# Patient Record
Sex: FEMALE | Race: WHITE | NOT HISPANIC OR LATINO | Employment: FULL TIME | URBAN - METROPOLITAN AREA
[De-identification: names, ages, dates, MRNs, and addresses within clinical notes are randomized per-mention and may not be internally consistent; named-entity substitution may affect disease eponyms.]

---

## 2018-01-03 ENCOUNTER — HOSPITAL ENCOUNTER (EMERGENCY)
Facility: HOSPITAL | Age: 63
End: 2018-01-04
Attending: EMERGENCY MEDICINE | Admitting: EMERGENCY MEDICINE
Payer: COMMERCIAL

## 2018-01-03 ENCOUNTER — APPOINTMENT (EMERGENCY)
Dept: CT IMAGING | Facility: HOSPITAL | Age: 63
End: 2018-01-03
Payer: COMMERCIAL

## 2018-01-03 DIAGNOSIS — L03.90 CELLULITIS: ICD-10-CM

## 2018-01-03 DIAGNOSIS — S42.302A LEFT HUMERAL FRACTURE: ICD-10-CM

## 2018-01-03 DIAGNOSIS — L02.31 GLUTEAL ABSCESS: Primary | ICD-10-CM

## 2018-01-03 LAB
ALBUMIN SERPL BCP-MCNC: 3.2 G/DL (ref 3.5–5)
ALP SERPL-CCNC: 77 U/L (ref 46–116)
ALT SERPL W P-5'-P-CCNC: 22 U/L (ref 12–78)
ANION GAP SERPL CALCULATED.3IONS-SCNC: 12 MMOL/L (ref 4–13)
AST SERPL W P-5'-P-CCNC: 12 U/L (ref 5–45)
BASOPHILS # BLD AUTO: 0.07 THOUSANDS/ΜL (ref 0–0.1)
BASOPHILS NFR BLD AUTO: 0 % (ref 0–1)
BILIRUB DIRECT SERPL-MCNC: 0.26 MG/DL (ref 0–0.2)
BILIRUB SERPL-MCNC: 1 MG/DL (ref 0.2–1)
BUN SERPL-MCNC: 12 MG/DL (ref 5–25)
CALCIUM SERPL-MCNC: 8.9 MG/DL (ref 8.3–10.1)
CHLORIDE SERPL-SCNC: 98 MMOL/L (ref 100–108)
CO2 SERPL-SCNC: 24 MMOL/L (ref 21–32)
CREAT SERPL-MCNC: 0.69 MG/DL (ref 0.6–1.3)
EOSINOPHIL # BLD AUTO: 0.01 THOUSAND/ΜL (ref 0–0.61)
EOSINOPHIL NFR BLD AUTO: 0 % (ref 0–6)
ERYTHROCYTE [DISTWIDTH] IN BLOOD BY AUTOMATED COUNT: 13.2 % (ref 11.6–15.1)
GFR SERPL CREATININE-BSD FRML MDRD: 94 ML/MIN/1.73SQ M
GLUCOSE SERPL-MCNC: 184 MG/DL (ref 65–140)
HCT VFR BLD AUTO: 42 % (ref 34.8–46.1)
HGB BLD-MCNC: 14.2 G/DL (ref 11.5–15.4)
LACTATE SERPL-SCNC: 1.4 MMOL/L (ref 0.5–2)
LIPASE SERPL-CCNC: 36 U/L (ref 73–393)
LYMPHOCYTES # BLD AUTO: 1.61 THOUSANDS/ΜL (ref 0.6–4.47)
LYMPHOCYTES NFR BLD AUTO: 6 % (ref 14–44)
MCH RBC QN AUTO: 29.8 PG (ref 26.8–34.3)
MCHC RBC AUTO-ENTMCNC: 33.8 G/DL (ref 31.4–37.4)
MCV RBC AUTO: 88 FL (ref 82–98)
MONOCYTES # BLD AUTO: 2.07 THOUSAND/ΜL (ref 0.17–1.22)
MONOCYTES NFR BLD AUTO: 8 % (ref 4–12)
NEUTROPHILS # BLD AUTO: 23.01 THOUSANDS/ΜL (ref 1.85–7.62)
NEUTS SEG NFR BLD AUTO: 85 % (ref 43–75)
NRBC BLD AUTO-RTO: 0 /100 WBCS
PLATELET # BLD AUTO: 248 THOUSANDS/UL (ref 149–390)
PMV BLD AUTO: 9.3 FL (ref 8.9–12.7)
POTASSIUM SERPL-SCNC: 3.7 MMOL/L (ref 3.5–5.3)
PROT SERPL-MCNC: 7.5 G/DL (ref 6.4–8.2)
RBC # BLD AUTO: 4.77 MILLION/UL (ref 3.81–5.12)
SODIUM SERPL-SCNC: 134 MMOL/L (ref 136–145)
WBC # BLD AUTO: 27 THOUSAND/UL (ref 4.31–10.16)

## 2018-01-03 PROCEDURE — 83605 ASSAY OF LACTIC ACID: CPT | Performed by: EMERGENCY MEDICINE

## 2018-01-03 PROCEDURE — 80048 BASIC METABOLIC PNL TOTAL CA: CPT | Performed by: EMERGENCY MEDICINE

## 2018-01-03 PROCEDURE — 36415 COLL VENOUS BLD VENIPUNCTURE: CPT | Performed by: EMERGENCY MEDICINE

## 2018-01-03 PROCEDURE — 96375 TX/PRO/DX INJ NEW DRUG ADDON: CPT

## 2018-01-03 PROCEDURE — 93005 ELECTROCARDIOGRAM TRACING: CPT

## 2018-01-03 PROCEDURE — 85025 COMPLETE CBC W/AUTO DIFF WBC: CPT | Performed by: EMERGENCY MEDICINE

## 2018-01-03 PROCEDURE — 80076 HEPATIC FUNCTION PANEL: CPT | Performed by: EMERGENCY MEDICINE

## 2018-01-03 PROCEDURE — 96365 THER/PROPH/DIAG IV INF INIT: CPT

## 2018-01-03 PROCEDURE — 74177 CT ABD & PELVIS W/CONTRAST: CPT

## 2018-01-03 PROCEDURE — 83690 ASSAY OF LIPASE: CPT | Performed by: EMERGENCY MEDICINE

## 2018-01-03 PROCEDURE — 87040 BLOOD CULTURE FOR BACTERIA: CPT | Performed by: EMERGENCY MEDICINE

## 2018-01-03 RX ORDER — FENTANYL CITRATE 50 UG/ML
100 INJECTION, SOLUTION INTRAMUSCULAR; INTRAVENOUS ONCE
Status: COMPLETED | OUTPATIENT
Start: 2018-01-03 | End: 2018-01-03

## 2018-01-03 RX ORDER — LIDOCAINE HYDROCHLORIDE 20 MG/ML
10 INJECTION, SOLUTION EPIDURAL; INFILTRATION; INTRACAUDAL; PERINEURAL ONCE
Status: COMPLETED | OUTPATIENT
Start: 2018-01-03 | End: 2018-01-03

## 2018-01-03 RX ORDER — ONDANSETRON 2 MG/ML
4 INJECTION INTRAMUSCULAR; INTRAVENOUS ONCE
Status: COMPLETED | OUTPATIENT
Start: 2018-01-03 | End: 2018-01-03

## 2018-01-03 RX ORDER — CLINDAMYCIN PHOSPHATE 600 MG/50ML
600 INJECTION INTRAVENOUS EVERY 8 HOURS
Status: DISCONTINUED | OUTPATIENT
Start: 2018-01-03 | End: 2018-01-04 | Stop reason: HOSPADM

## 2018-01-03 RX ADMIN — FENTANYL CITRATE 100 MCG: 50 INJECTION INTRAMUSCULAR; INTRAVENOUS at 23:01

## 2018-01-03 RX ADMIN — ONDANSETRON 4 MG: 2 INJECTION INTRAMUSCULAR; INTRAVENOUS at 23:01

## 2018-01-03 RX ADMIN — IOHEXOL 100 ML: 350 INJECTION, SOLUTION INTRAVENOUS at 23:31

## 2018-01-03 RX ADMIN — CLINDAMYCIN PHOSPHATE 600 MG: 12 INJECTION, SOLUTION INTRAMUSCULAR; INTRAVENOUS at 23:01

## 2018-01-03 RX ADMIN — LIDOCAINE HYDROCHLORIDE 10 ML: 20 INJECTION, SOLUTION EPIDURAL; INFILTRATION; INTRACAUDAL; PERINEURAL at 23:00

## 2018-01-03 RX ADMIN — SODIUM CHLORIDE 1000 ML: 0.9 INJECTION, SOLUTION INTRAVENOUS at 23:01

## 2018-01-04 ENCOUNTER — HOSPITAL ENCOUNTER (INPATIENT)
Facility: HOSPITAL | Age: 63
LOS: 5 days | Discharge: HOME HEALTH | DRG: 746 | End: 2018-01-09
Attending: SURGERY | Admitting: SURGERY
Payer: COMMERCIAL

## 2018-01-04 ENCOUNTER — ANESTHESIA (INPATIENT)
Dept: PERIOP | Facility: HOSPITAL | Age: 63
DRG: 746 | End: 2018-01-04
Payer: COMMERCIAL

## 2018-01-04 ENCOUNTER — ANESTHESIA EVENT (INPATIENT)
Dept: PERIOP | Facility: HOSPITAL | Age: 63
DRG: 746 | End: 2018-01-04
Payer: COMMERCIAL

## 2018-01-04 VITALS
OXYGEN SATURATION: 95 % | SYSTOLIC BLOOD PRESSURE: 134 MMHG | WEIGHT: 290 LBS | RESPIRATION RATE: 18 BRPM | DIASTOLIC BLOOD PRESSURE: 65 MMHG | HEART RATE: 106 BPM | TEMPERATURE: 99.2 F | HEIGHT: 68 IN | BODY MASS INDEX: 43.95 KG/M2

## 2018-01-04 DIAGNOSIS — L08.9 SOFT TISSUE INFECTION: Primary | ICD-10-CM

## 2018-01-04 LAB
ABO GROUP BLD: NORMAL
ANION GAP SERPL CALCULATED.3IONS-SCNC: 9 MMOL/L (ref 4–13)
ANION GAP SERPL CALCULATED.3IONS-SCNC: 9 MMOL/L (ref 4–13)
ATRIAL RATE: 111 BPM
BLD GP AB SCN SERPL QL: NEGATIVE
BUN SERPL-MCNC: 11 MG/DL (ref 5–25)
BUN SERPL-MCNC: 11 MG/DL (ref 5–25)
CALCIUM SERPL-MCNC: 8.1 MG/DL (ref 8.3–10.1)
CALCIUM SERPL-MCNC: 8.6 MG/DL (ref 8.3–10.1)
CHLORIDE SERPL-SCNC: 101 MMOL/L (ref 100–108)
CHLORIDE SERPL-SCNC: 104 MMOL/L (ref 100–108)
CO2 SERPL-SCNC: 21 MMOL/L (ref 21–32)
CO2 SERPL-SCNC: 24 MMOL/L (ref 21–32)
CREAT SERPL-MCNC: 0.64 MG/DL (ref 0.6–1.3)
CREAT SERPL-MCNC: 0.69 MG/DL (ref 0.6–1.3)
ERYTHROCYTE [DISTWIDTH] IN BLOOD BY AUTOMATED COUNT: 13.4 % (ref 11.6–15.1)
GFR SERPL CREATININE-BSD FRML MDRD: 94 ML/MIN/1.73SQ M
GFR SERPL CREATININE-BSD FRML MDRD: 97 ML/MIN/1.73SQ M
GLUCOSE SERPL-MCNC: 147 MG/DL (ref 65–140)
GLUCOSE SERPL-MCNC: 157 MG/DL (ref 65–140)
GLUCOSE SERPL-MCNC: 160 MG/DL (ref 65–140)
HCT VFR BLD AUTO: 39 % (ref 34.8–46.1)
HGB BLD-MCNC: 13.2 G/DL (ref 11.5–15.4)
LACTATE SERPL-SCNC: 1.1 MMOL/L (ref 0.5–2)
LACTATE SERPL-SCNC: 1.3 MMOL/L (ref 0.5–2)
MCH RBC QN AUTO: 30.6 PG (ref 26.8–34.3)
MCHC RBC AUTO-ENTMCNC: 33.8 G/DL (ref 31.4–37.4)
MCV RBC AUTO: 90 FL (ref 82–98)
P AXIS: 65 DEGREES
PLATELET # BLD AUTO: 207 THOUSANDS/UL (ref 149–390)
PLATELET # BLD AUTO: 228 THOUSANDS/UL (ref 149–390)
PMV BLD AUTO: 9.1 FL (ref 8.9–12.7)
PMV BLD AUTO: 9.7 FL (ref 8.9–12.7)
POTASSIUM SERPL-SCNC: 3.5 MMOL/L (ref 3.5–5.3)
POTASSIUM SERPL-SCNC: 3.7 MMOL/L (ref 3.5–5.3)
PR INTERVAL: 142 MS
QRS AXIS: -9 DEGREES
QRSD INTERVAL: 92 MS
QT INTERVAL: 368 MS
QTC INTERVAL: 500 MS
RBC # BLD AUTO: 4.32 MILLION/UL (ref 3.81–5.12)
RH BLD: POSITIVE
SODIUM SERPL-SCNC: 134 MMOL/L (ref 136–145)
SODIUM SERPL-SCNC: 134 MMOL/L (ref 136–145)
SPECIMEN EXPIRATION DATE: NORMAL
T WAVE AXIS: 71 DEGREES
VENTRICULAR RATE: 111 BPM
WBC # BLD AUTO: 14.97 THOUSAND/UL (ref 4.31–10.16)

## 2018-01-04 PROCEDURE — 80048 BASIC METABOLIC PNL TOTAL CA: CPT | Performed by: SURGERY

## 2018-01-04 PROCEDURE — 87102 FUNGUS ISOLATION CULTURE: CPT | Performed by: SURGERY

## 2018-01-04 PROCEDURE — 87075 CULTR BACTERIA EXCEPT BLOOD: CPT | Performed by: SURGERY

## 2018-01-04 PROCEDURE — 87176 TISSUE HOMOGENIZATION CULTR: CPT | Performed by: SURGERY

## 2018-01-04 PROCEDURE — 99285 EMERGENCY DEPT VISIT HI MDM: CPT

## 2018-01-04 PROCEDURE — 36415 COLL VENOUS BLD VENIPUNCTURE: CPT | Performed by: SURGERY

## 2018-01-04 PROCEDURE — 87070 CULTURE OTHR SPECIMN AEROBIC: CPT | Performed by: SURGERY

## 2018-01-04 PROCEDURE — 82948 REAGENT STRIP/BLOOD GLUCOSE: CPT

## 2018-01-04 PROCEDURE — 85027 COMPLETE CBC AUTOMATED: CPT | Performed by: SURGERY

## 2018-01-04 PROCEDURE — 99284 EMERGENCY DEPT VISIT MOD MDM: CPT

## 2018-01-04 PROCEDURE — 86850 RBC ANTIBODY SCREEN: CPT | Performed by: SURGERY

## 2018-01-04 PROCEDURE — 83605 ASSAY OF LACTIC ACID: CPT | Performed by: SURGERY

## 2018-01-04 PROCEDURE — 87116 MYCOBACTERIA CULTURE: CPT | Performed by: SURGERY

## 2018-01-04 PROCEDURE — 0UBM0ZX EXCISION OF VULVA, OPEN APPROACH, DIAGNOSTIC: ICD-10-PCS | Performed by: SURGERY

## 2018-01-04 PROCEDURE — 87205 SMEAR GRAM STAIN: CPT | Performed by: SURGERY

## 2018-01-04 PROCEDURE — 85049 AUTOMATED PLATELET COUNT: CPT | Performed by: SURGERY

## 2018-01-04 PROCEDURE — 86901 BLOOD TYPING SEROLOGIC RH(D): CPT | Performed by: SURGERY

## 2018-01-04 PROCEDURE — 87206 SMEAR FLUORESCENT/ACID STAI: CPT | Performed by: SURGERY

## 2018-01-04 PROCEDURE — 87185 SC STD ENZYME DETCJ PER NZM: CPT | Performed by: SURGERY

## 2018-01-04 PROCEDURE — 96367 TX/PROPH/DG ADDL SEQ IV INF: CPT

## 2018-01-04 PROCEDURE — 86900 BLOOD TYPING SEROLOGIC ABO: CPT | Performed by: SURGERY

## 2018-01-04 RX ORDER — ACETAMINOPHEN 325 MG/1
650 TABLET ORAL EVERY 6 HOURS PRN
Status: DISCONTINUED | OUTPATIENT
Start: 2018-01-04 | End: 2018-01-05

## 2018-01-04 RX ORDER — FENTANYL CITRATE 50 UG/ML
25 INJECTION, SOLUTION INTRAMUSCULAR; INTRAVENOUS
Status: DISCONTINUED | OUTPATIENT
Start: 2018-01-04 | End: 2018-01-05

## 2018-01-04 RX ORDER — HEPARIN SODIUM 5000 [USP'U]/ML
5000 INJECTION, SOLUTION INTRAVENOUS; SUBCUTANEOUS EVERY 8 HOURS SCHEDULED
Status: DISCONTINUED | OUTPATIENT
Start: 2018-01-04 | End: 2018-01-09 | Stop reason: HOSPADM

## 2018-01-04 RX ORDER — FENTANYL CITRATE/PF 50 MCG/ML
25 SYRINGE (ML) INJECTION
Status: DISCONTINUED | OUTPATIENT
Start: 2018-01-04 | End: 2018-01-04 | Stop reason: HOSPADM

## 2018-01-04 RX ORDER — CLINDAMYCIN PHOSPHATE 600 MG/50ML
600 INJECTION INTRAVENOUS EVERY 8 HOURS
Status: DISCONTINUED | OUTPATIENT
Start: 2018-01-04 | End: 2018-01-07

## 2018-01-04 RX ORDER — ONDANSETRON 2 MG/ML
4 INJECTION INTRAMUSCULAR; INTRAVENOUS ONCE AS NEEDED
Status: DISCONTINUED | OUTPATIENT
Start: 2018-01-04 | End: 2018-01-04 | Stop reason: HOSPADM

## 2018-01-04 RX ORDER — FENTANYL CITRATE 50 UG/ML
INJECTION, SOLUTION INTRAMUSCULAR; INTRAVENOUS AS NEEDED
Status: DISCONTINUED | OUTPATIENT
Start: 2018-01-04 | End: 2018-01-04 | Stop reason: SURG

## 2018-01-04 RX ORDER — PROPOFOL 10 MG/ML
INJECTION, EMULSION INTRAVENOUS AS NEEDED
Status: DISCONTINUED | OUTPATIENT
Start: 2018-01-04 | End: 2018-01-04 | Stop reason: SURG

## 2018-01-04 RX ORDER — MAGNESIUM HYDROXIDE 1200 MG/15ML
LIQUID ORAL AS NEEDED
Status: DISCONTINUED | OUTPATIENT
Start: 2018-01-04 | End: 2018-01-04 | Stop reason: HOSPADM

## 2018-01-04 RX ORDER — ALBUTEROL SULFATE 2.5 MG/3ML
2.5 SOLUTION RESPIRATORY (INHALATION) EVERY 4 HOURS PRN
Status: DISCONTINUED | OUTPATIENT
Start: 2018-01-04 | End: 2018-01-04 | Stop reason: HOSPADM

## 2018-01-04 RX ORDER — SUCCINYLCHOLINE CHLORIDE 20 MG/ML
INJECTION INTRAMUSCULAR; INTRAVENOUS AS NEEDED
Status: DISCONTINUED | OUTPATIENT
Start: 2018-01-04 | End: 2018-01-04 | Stop reason: SURG

## 2018-01-04 RX ORDER — KETOROLAC TROMETHAMINE 30 MG/ML
INJECTION, SOLUTION INTRAMUSCULAR; INTRAVENOUS AS NEEDED
Status: DISCONTINUED | OUTPATIENT
Start: 2018-01-04 | End: 2018-01-04 | Stop reason: SURG

## 2018-01-04 RX ORDER — PROMETHAZINE HYDROCHLORIDE 25 MG/ML
12.5 INJECTION, SOLUTION INTRAMUSCULAR; INTRAVENOUS ONCE AS NEEDED
Status: DISCONTINUED | OUTPATIENT
Start: 2018-01-04 | End: 2018-01-04 | Stop reason: HOSPADM

## 2018-01-04 RX ORDER — SODIUM CHLORIDE 9 MG/ML
125 INJECTION, SOLUTION INTRAVENOUS CONTINUOUS
Status: DISCONTINUED | OUTPATIENT
Start: 2018-01-04 | End: 2018-01-04

## 2018-01-04 RX ORDER — DIPHENHYDRAMINE HYDROCHLORIDE 50 MG/ML
INJECTION INTRAMUSCULAR; INTRAVENOUS
Status: DISPENSED
Start: 2018-01-04 | End: 2018-01-04

## 2018-01-04 RX ORDER — LEVOTHYROXINE SODIUM 0.12 MG/1
125 TABLET ORAL DAILY
COMMUNITY

## 2018-01-04 RX ADMIN — FENTANYL CITRATE 25 MCG: 50 INJECTION INTRAMUSCULAR; INTRAVENOUS at 03:29

## 2018-01-04 RX ADMIN — CLINDAMYCIN PHOSPHATE 600 MG: 12 INJECTION, SOLUTION INTRAMUSCULAR; INTRAVENOUS at 14:40

## 2018-01-04 RX ADMIN — HEPARIN SODIUM 5000 UNITS: 5000 INJECTION, SOLUTION INTRAVENOUS; SUBCUTANEOUS at 22:16

## 2018-01-04 RX ADMIN — SUCCINYLCHOLINE CHLORIDE 160 MG: 20 INJECTION, SOLUTION INTRAMUSCULAR; INTRAVENOUS at 05:13

## 2018-01-04 RX ADMIN — FENTANYL CITRATE 50 MCG: 50 INJECTION, SOLUTION INTRAMUSCULAR; INTRAVENOUS at 05:29

## 2018-01-04 RX ADMIN — Medication 2000 MG: at 13:47

## 2018-01-04 RX ADMIN — DEXTROSE MONOHYDRATE 3.38 G: 50 INJECTION, SOLUTION INTRAVENOUS at 04:37

## 2018-01-04 RX ADMIN — FENTANYL CITRATE 50 MCG: 50 INJECTION, SOLUTION INTRAMUSCULAR; INTRAVENOUS at 05:36

## 2018-01-04 RX ADMIN — PROPOFOL 200 MG: 10 INJECTION, EMULSION INTRAVENOUS at 05:13

## 2018-01-04 RX ADMIN — LIDOCAINE HYDROCHLORIDE 100 MG: 20 INJECTION, SOLUTION INTRAVENOUS at 05:13

## 2018-01-04 RX ADMIN — HEPARIN SODIUM 5000 UNITS: 5000 INJECTION, SOLUTION INTRAVENOUS; SUBCUTANEOUS at 13:52

## 2018-01-04 RX ADMIN — SODIUM CHLORIDE 2000 ML: 0.9 INJECTION, SOLUTION INTRAVENOUS at 03:31

## 2018-01-04 RX ADMIN — Medication 2000 MG: at 01:16

## 2018-01-04 RX ADMIN — ACETAMINOPHEN 650 MG: 325 TABLET, FILM COATED ORAL at 22:16

## 2018-01-04 RX ADMIN — FENTANYL CITRATE 25 MCG: 50 INJECTION INTRAMUSCULAR; INTRAVENOUS at 19:44

## 2018-01-04 RX ADMIN — CLINDAMYCIN PHOSPHATE 600 MG: 12 INJECTION, SOLUTION INTRAMUSCULAR; INTRAVENOUS at 22:22

## 2018-01-04 RX ADMIN — SODIUM CHLORIDE 125 ML/HR: 0.9 INJECTION, SOLUTION INTRAVENOUS at 12:23

## 2018-01-04 RX ADMIN — FENTANYL CITRATE 25 MCG: 50 INJECTION INTRAMUSCULAR; INTRAVENOUS at 14:00

## 2018-01-04 RX ADMIN — VANCOMYCIN HYDROCHLORIDE 2000 MG: 10 INJECTION, POWDER, LYOPHILIZED, FOR SOLUTION INTRAVENOUS at 19:37

## 2018-01-04 RX ADMIN — KETOROLAC TROMETHAMINE 30 MG: 30 INJECTION, SOLUTION INTRAMUSCULAR at 05:51

## 2018-01-04 RX ADMIN — SODIUM CHLORIDE: 0.9 INJECTION, SOLUTION INTRAVENOUS at 05:02

## 2018-01-04 RX ADMIN — FENTANYL CITRATE 25 MCG: 50 INJECTION INTRAMUSCULAR; INTRAVENOUS at 08:20

## 2018-01-04 RX ADMIN — DIPHENHYDRAMINE HYDROCHLORIDE 50 MG: 50 INJECTION, SOLUTION INTRAMUSCULAR; INTRAVENOUS at 04:37

## 2018-01-04 RX ADMIN — VANCOMYCIN HYDROCHLORIDE 2000 MG: 10 INJECTION, POWDER, LYOPHILIZED, FOR SOLUTION INTRAVENOUS at 05:20

## 2018-01-04 NOTE — SOCIAL WORK
Cm explained role of cm to pt at bedside  Patient lives alone in 1 story home, 3 WILL  Prior to admission, patient was independent with ADLs  Patient has cane, commode, and shower chair in home  Patient reports having home health through [facility] after her hip replacement but does not recall agency  Patient also has used [facility] for outpatient pt/ot around that time as well  No hx of mental health or alcohol/drug treatment  Preferred pharmacy is [facility] in [place], PCP is Dr Noe Yung in [place]  CM reviewed d/c planning process including the following: identifying help at home, patient preference for d/c planning needs, Discharge Lounge, [facility]s to [facility] program, availability of treatment team to discuss questions or concerns patient and/or family may have regarding understanding medications and recognizing signs and symptoms once discharged  CM also encouraged patient to follow up with all recommended appointments after discharge   Patient advised of importance for patient and family to participate in managing patients medical well being     ~I have reviewed and agree to the above documentation~  Keven Oliveira MSW

## 2018-01-04 NOTE — PROGRESS NOTES
I discussed the proposed procedure with the patient including, risks, benefits, and alternatives  He/she understands and is agreeable to proceed

## 2018-01-04 NOTE — H&P
H&P Exam - General Surgery   Yakelin Triana 65 y o  female MRN: [id number]  Unit/Bed#: ED 14 Encounter: [id number]    Assessment/Plan     Assessment:  66 yo F with soft tissue infection of right labia extending to the perineum and inferior gluteal soft tissues  CT shows evidence of gas in tissue  Significant leukocytosis is present (WBC 27)  Plan:  OR as add on for incision & drainage vs excisional debridement of right perineum  Preop, T&S, consent for OR  2L bolus now, NPO  Fentanyl for pain- reports allergy to morphine  Zosyn now preoperatively (recieved Cefepime & Clinda at [facility])  Will order Vanc/Zosyn/Clinda post op  F/U Blood and wound cx    History of Present Illness     HPI:  Yakelin Triana is a 65 y o  female who presents with  5 day history of worsening pain in her right groin  She states she fell on Friday (and fractured her left humerus) and noticed pain the next day, and believed it was due to her fall  However, the pain in her right labial area increased and she began to experience fevers, chills, nausea, and vomiting, especially today  She denies hx of DM (states she is pre-diabetic) or hx of soft tissue infections  She went to the [facility] where an I&D of the right lateral labia was performed  Purulent drainage was reported  CT was performed which shows extensive subcutaneous inflammation in the subcutaneous soft tissues of the right aspect of the vagina and mons pubis    There is also a nodular area of soft tissue inflammation in the right labia majora, suggesting a 2 4 cm phlegmon or organizing abscess      Review of Systems as per HPI    Historical Information   Past Medical History:   Diagnosis Date    Cancer [facility])     thyroid    Hiatal hernia     Prediabetes      Past Surgical History:   Procedure Laterality Date    CHOLECYSTECTOMY      MECKEL DIVERTICULUM EXCISION       Social History   History   Alcohol Use No     History   Drug Use No     History   Smoking Status    Never Smoker   Smokeless Tobacco    Never Used     Family History: non-contributory    Meds/Allergies   all medications and allergies reviewed  Allergies   Allergen Reactions    Morphine     Penicillins        Objective   First Vitals:   Blood Pressure: 124/65 (01/04/18 0242)  Pulse: (!) 108 (01/04/18 0242)  Temperature: 98 7 °F (37 1 °C) (01/04/18 0244)  Temp Source: Oral (01/04/18 0244)  Respirations: 20 (01/04/18 0242)  Height: 5' 8" (172 7 cm) (01/04/18 0242)  Weight - Scale: 132 kg (290 lb) (01/04/18 0242)  SpO2: 94 % (01/04/18 0242)    Current Vitals:   Blood Pressure: 124/65 (01/04/18 0242)  Pulse: (!) 108 (01/04/18 0242)  Temperature: 98 7 °F (37 1 °C) (01/04/18 0244)  Temp Source: Oral (01/04/18 0244)  Respirations: 20 (01/04/18 0242)  Height: 5' 8" (172 7 cm) (01/04/18 0242)  Weight - Scale: 132 kg (290 lb) (01/04/18 0242)  SpO2: 94 % (01/04/18 0242)    No intake or output data in the 24 hours ending 01/04/18 0301    Invasive Devices     Peripheral Intravenous Line            Peripheral IV 01/03/18 Right Hand less than 1 day                Physical Exam   Physical Exam:   Gen: A&O, NAD  Cardio: Slightly tachycardia  Lungs: CTAB  Abd: Soft, non distended, non tender  Groin: erythema and induration of right labia, extending to perineum  No crepitus  Small amount of sanginous drainage from I&D incision when pressure applied  Ext: Warm, dry   No erythema of thigh  Vasc: Intact      Lab Results:   CBC:   Lab Results   Component Value Date    WBC 27 00 (H) 01/03/2018    HGB 14 2 01/03/2018    HCT 42 0 01/03/2018    MCV 88 01/03/2018     01/03/2018    MCH 29 8 01/03/2018    MCHC 33 8 01/03/2018    RDW 13 2 01/03/2018    MPV 9 3 01/03/2018    NRBC 0 01/03/2018   , CMP:   Lab Results   Component Value Date     (L) 01/03/2018    K 3 7 01/03/2018    CL 98 (L) 01/03/2018    CO2 24 01/03/2018    ANIONGAP 12 01/03/2018    BUN 12 01/03/2018    CREATININE 0 69 01/03/2018    GLUCOSE 184 (H) 01/03/2018    CALCIUM 8 9 01/03/2018    AST 12 01/03/2018    ALT 22 01/03/2018    ALKPHOS 77 01/03/2018    PROT 7 5 01/03/2018    ALBUMIN 3 2 (L) 01/03/2018    BILITOT 1 00 01/03/2018    EGFR 94 01/03/2018     Imaging: I have personally reviewed pertinent reports  No orders to display   IMPRESSION:        1  Cellulitis involving the right aspect of the vagina and labia majora  2 4 cm lesion may represent phlegmon versus organizing abscess  2  Extensive inflammation and gas in the right aspect of the perineum and inferior gluteal soft tissues, concerning for necrotizing fasciitis versus postsurgical change from recent incision and drainage  No evidence of organized abscess in the perineum  3  No lymphadenopathy, free intraperitoneal air or collection  EKG, Pathology, and Other Studies: I have personally reviewed pertinent reports  Code Status: No Order  Advance Directive and Living Will:      Power of :    POLST:      Counseling / Coordination of Care  Total floor / unit time spent today 30 minutes  Greater than 50% of total time was spent with the patient and / or family counseling and / or coordination of care

## 2018-01-04 NOTE — CASE MANAGEMENT
ATTEMPTED TO SENT THRU [facility] AND [facility] FAILED    Initial Clinical Review    Admission: Date/Time/Statement: 1/4/18 @ 0300     Orders Placed This Encounter   Procedures    Inpatient Admission     Standing Status:   Standing     Number of Occurrences:   1     Order Specific Question:   Admitting Physician     Answer:   Jessica Rowe     Order Specific Question:   Level of Care     Answer:   Med Surg [16]     Order Specific Question:   Estimated length of stay     Answer:   More than 2 Midnights     Order Specific Question:   Certification     Answer:   I certify that inpatient services are medically necessary for this patient for a duration of greater than two midnights  See H&P and MD Progress Notes for additional information about the patient's course of treatment  ED: Date/Time/Mode of Arrival: 1/4 Transfer from [id number] [facility] ED to [address] [facility]  Pt at [facility] from 1/3 thru 1/4    ED Arrival Information     Expected Arrival 70 Sh[address] Dara Edwards of Arrival Escorted By Service Admission Type    1/4/2018 02:23 1/4/2018 02:38 Urgent Ambulance [facility] Urgent    Arrival Complaint    Groin pain          Chief Complaint:   Chief Complaint   Patient presents with    Abscess     pt transfer from [facility]        History of Illness: 65 y o  female who presents with  5 day history of worsening pain in her right groin  She states she fell on Friday (and fractured her left humerus) and noticed pain the next day, and believed it was due to her fall  However, the pain in her right labial area increased and she began to experience fevers, chills, nausea, and vomiting, especially today  She denies hx of DM (states she is pre-diabetic) or hx of soft tissue infections  She went to the [facility] where an I&D of the right lateral labia was performed  Purulent drainage was reported   CT was performed which shows extensive subcutaneous inflammation in the subcutaneous soft tissues of the right aspect of the vagina and mons pubis    There is also a nodular area of soft tissue inflammation in the right labia majora, suggesting a 2 4 cm phlegmon or organizing abscess        ED Vital Signs:   ED Triage Vitals   Temperature Pulse Respirations Blood Pressure SpO2   01/04/18 0244 01/04/18 0242 01/04/18 0242 01/04/18 0242 01/04/18 0242   98 7 °F (37 1 °C) (!) 108 20 124/65 94 %      Temp Source Heart Rate Source Patient Position - Orthostatic VS BP Location FiO2 (%)   01/04/18 0244 01/04/18 0242 01/04/18 0730 01/04/18 0730 --   Oral Monitor Lying Right arm       Pain Score       01/04/18 0242       8        Wt Readings from Last 1 Encounters:   01/04/18 132 kg (291 lb 0 1 oz)     O2 3L N/C 94%    Vital Signs (abnormal):   Date/Time  Temp  Pulse  Resp  BP  SpO2  O2 Device     01/04/18 0609  98 °F (36 7 °C)  103  16  112/63  95 %  Simple mask  --  --   01/04/18 0607  98 °F (36 7 °C)  101  15  112/63  95 %  Simple mask  WDL  --   01/04/18 0430  --  104  20  130/60  92 %           Abnormal Labs:   01/03/18 2256     WBC 4 31 - 10 16 [place]ousand/uL 27 00       01/04/18 0424     Sodium 136 - 145 mmol/L 134         Diagnostic Test Results: CT Abd/ Pelvis - 1  Cellulitis involving the right aspect of the vagina and labia majora  2 4 cm lesion may represent phlegmon versus organizing abscess  2  Extensive inflammation and gas in the right aspect of the perineum and inferior gluteal soft tissues, concerning for necrotizing fasciitis versus postsurgical change from recent incision and drainage  No evidence of organized abscess in the perineum  3  No lymphadenopathy, free intraperitoneal air or collection      ED Treatment:   Medication Administration from 01/04/2018 0223 to 01/04/2018 0447       Date/Time Order Dose Route Action     01/04/2018 0437 piperacillin-tazobactam (ZOSYN) 3 375 g in dextrose 5 % 50 mL IVPB 3 375 g Intravenous New Bag     01/04/2018 0331 sodium chloride 0 9 % bolus 2,000 mL 2,000 mL Intravenous New Bag     01/04/2018 0329 fentanyl citrate (PF) 100 MCG/2ML 25 mcg 25 mcg Intravenous Given     01/04/2018 0437 diphenhydrAMINE (BENADRYL) 50 mg in sodium chloride 0 9 % 50 mL IVPB 50 mg Intravenous New Bag          Past Medical/Surgical History: Active Ambulatory Problems     Diagnosis Date Noted    No Active Ambulatory Problems     Resolved Ambulatory Problems     Diagnosis Date Noted    No Resolved Ambulatory Problems     Past Medical History:   Diagnosis Date    Cancer ([facility] [address] )     Hiatal hernia     Prediabetes        Admitting Diagnosis: Groin pain [R10 30]  Soft tissue infection [L08 9]    Age/Sex: 65 y o  female       Assessment:  64 yo F with soft tissue infection of right labia extending to the perineum and inferior gluteal soft tissues  CT shows evidence of gas in tissue  Significant leukocytosis is present (WBC 27)       Plan:  OR as add on for incision & drainage vs excisional debridement of right perineum  Preop, T&S, consent for OR  2L bolus now, NPO  Fentanyl for pain- reports allergy to morphine  Zosyn now preoperatively (recieved Cefepime & Clinda at [place])  Will order Vanc/Zosyn/Clinda post op  F/U Blood and wound cx       Admission Orders:  NPO; Sips with meds  1/4 OR - incision and drainage DEBRIDEMENT right labia (Right Perineum)    Fungal culture  AFB Culture and Stain  Wound care    Scheduled Meds:   cefepime 2,000 mg Intravenous Q12H   clindamycin 600 mg Intravenous Q8H   diphenhydrAMINE      heparin (porcine) 5,000 Units Subcutaneous Q8H [facility]   vancomycin 15 mg/kg Intravenous Q12H     Continuous Infusions:   sodium chloride 125 mL/hr Last Rate: 125 mL/hr (01/04/18 0817)     PRN Meds:   acetaminophen    diphenhydrAMINE (BENADRYL) IVPB    fentanyl citrate (PF)    ondansetron

## 2018-01-04 NOTE — OP NOTE
OPERATIVE REPORT  PATIENT NAME: Miguel Angel Garcia    :  1955  MRN: [id number]  Pt Location:  OR ROOM 09    SURGERY DATE: 2018    Surgeon(s) and Role:     * Lias Momin MD - Assisting     * Brian Simeon MD - Primary    Preop Diagnosis:  Soft tissue infection [L08 9]    Post-Op Diagnosis Codes: * Soft tissue infection [L08 9]    Procedure(s) (LRB):  incision and drainage DEBRIDEMENT right labia (Right)    Specimen(s):  ID Type Source Tests Collected by Time Destination   1 :  Tissue Labia ANAEROBIC CULTURE AND GRAM STAIN, FUNGAL CULTURE, CULTURE, TISSUE AND GRAM STAIN, TISSUE EXAM, AFB CULTURE WITH STAIN Brian Simeon MD 2018 [id number]        Estimated Blood Loss:   Minimal    Drains:       Anesthesia Type:   General    Operative Indications:  Soft tissue infection [L08 9]  Right labial abscess    Operative Findings:  Right labial abscess cavity, sharply debrided, packed with 1" iodoform gauze    Complications:   None    Procedure and Technique: This is a 23-year-old female presented to [address] [place] [address] with a right labial swelling  A CT scan was performed which demonstrated air and subcutaneous tissues concerning for an abscess or necrotizing infection  A small incision and drainage was performed the emergency department at [facility] and the patient was sent downstairs pathologic for further care  Upon evaluation, the patient had a very erythematous and edematous right labial area concerning for necrotizing abscess  Therefore decision was made to take her to the operating room for a incision and drainage, washout debridement of the right labial abscess  All risks, benefits, alternatives were explained to the patient patient agreed the plan of care  Patient was identified by armband and verbal communication brought back to the operating room  She was induced by general anesthesia via endotracheal intubation  She was placed in lithotomy and a Fontana catheter was placed  Her perineal area was prepped with Betadine and draped in usual sterile fashion  A time-out was called to review the procedure and details  Antibiotics had already been administered in the emergency department  The previous incision and drainage incision from the emergency department was extended superiorly and inferiorly  Small amount of pus was expressed and this was sent for anaerobic and aerobic culture  The cavity was sharply debrided with a curette and Metzenbaum scissors  This was debrided down to healthy bleeding tissue  The wound was then evaluated and hemostasis was ensured  The wound was packed with 1 inch iodoform packing  Fluffs and mesh underwear were then placed, the patient was awoke from general anesthesia and brought to [facility] in stable condition  All instrument and sponge counts were correct at the conclusion of the case  RF scanning was negative  Dr Roland Hill was present for the entire operation        Patient Disposition:  PACU     SIGNATURE: Rosa Olvera MD  DATE: January 4, 2018  TIME: 6:10 AM

## 2018-01-04 NOTE — ED NOTES
Report given EMS prior to transport  THE [facility] ED and gave report to Mildred Joshua at [phone]       Marisela Palacio RN  01/04/18 1897

## 2018-01-04 NOTE — ED PROVIDER NOTES
History  Chief Complaint   Patient presents with    Vomiting     Pt c/o vomiting since this morning and "not being able to keep anything down"  Pt also c/o "lump on my private parts lip on the right"  Pt states she was recently started on pain medication and doesn't know if that "gave her the lump"     HPI patient is a 25-year-old female, she reports that she fell on Friday, slipped on a frozen sidewalk and landed breaking her left humerus, patient has been seen by Orthopedics and has non operative management  She reports that they gave her a shot of pain medicines while she was in the hospital she medially started vomiting after that  She reports taking Percocet for pain but vomiting every time she takes Percocet  She reports that through the day today she became hot and flushed  Reports some sweating at times  Reports lump in her vaginal area which now has become red and indurated  She reports that she cannot hold any food down  She denies any real abdominal pain other than the area around the lump  She reports some fever at home  She reports no other injury after the fall  She denies any neck pain or headache  Past medical history of cancer of the thyroid, pre diabetes, recent humerus fracture  Family history noncontributory  Social history nonsmoker no history of drug abuse  None       Past Medical History:   Diagnosis Date    Cancer ([facility] [address] )     thyroid    Hiatal hernia     Prediabetes        Past Surgical History:   Procedure Laterality Date    CHOLECYSTECTOMY      MECKEL DIVERTICULUM EXCISION         History reviewed  No pertinent family history  I have reviewed and agree with the history as documented  Social History   Substance Use Topics    Smoking status: Never Smoker    Smokeless tobacco: Never Used    Alcohol use No        Review of Systems   Constitutional: Positive for chills and fever  Negative for diaphoresis and fatigue     HENT: Negative for congestion, ear pain, nosebleeds and sore throat  Eyes: Negative for photophobia, pain, discharge and visual disturbance  Respiratory: Negative for cough, choking, chest tightness, shortness of breath and wheezing  Cardiovascular: Negative for chest pain and palpitations  Gastrointestinal: Positive for nausea and vomiting  Negative for abdominal distention, abdominal pain and diarrhea  Genitourinary: Negative for dysuria, flank pain and frequency  Musculoskeletal: Negative for back pain, gait problem and joint swelling  Skin: Negative for color change and rash  Neurological: Negative for dizziness, syncope and headaches  Psychiatric/Behavioral: Negative for behavioral problems and confusion  The patient is not nervous/anxious  All other systems reviewed and are negative  Reports a lump in her right groin    Physical Exam  ED Triage Vitals [01/03/18 2112]   Temperature Pulse Respirations Blood Pressure SpO2   99 2 °F (37 3 °C) (!) 115 18 165/74 94 %      Temp Source Heart Rate Source Patient Position - Orthostatic VS BP Location FiO2 (%)   Oral Monitor Sitting Right arm --      Pain Score       8           Orthostatic Vital Signs  Vitals:    01/03/18 2112   BP: 165/74   Pulse: (!) 115   Patient Position - Orthostatic VS: Sitting       Physical Exam   Constitutional: She is oriented to person, place, and time  She appears well-developed and well-nourished  HENT:   Head: Normocephalic  Right Ear: External ear normal    Left Ear: External ear normal    Nose: Nose normal    Mouth/Throat: Oropharynx is clear and moist    Eyes: EOM and lids are normal  Pupils are equal, round, and reactive to light  Neck: Normal range of motion  Neck supple  Cardiovascular: Normal rate, regular rhythm and normal heart sounds  Pulmonary/Chest: Effort normal and breath sounds normal  No respiratory distress  Abdominal: Soft  Bowel sounds are normal  She exhibits no mass  There is no tenderness   There is no rebound and no guarding  No hernia  Patient has a large lump lateral to her right labia consistent with abscess, there was some spontaneous drainage of follow smelling material, area was opened, see procedure note  Musculoskeletal: Normal range of motion  She exhibits no deformity  Neurological: She is alert and oriented to person, place, and time  Skin: Skin is warm and dry  Psychiatric: She has a normal mood and affect  Nursing note and vitals reviewed  ED Medications  Medications   clindamycin (CLEOCIN) IVPB (premix) 600 mg (0 mg Intravenous Stopped 1/3/18 2331)   cefepime (MAXIPIME) 2,000 mg in dextrose 5 % 50 mL IVPB (2,000 mg Intravenous New Bag 1/4/18 0116)   sodium chloride 0 9 % bolus 1,000 mL (0 mL Intravenous Stopped 1/4/18 0001)   lidocaine (PF) (XYLOCAINE-MPF) 2 % injection 10 mL (10 mL Infiltration Given by Other 1/3/18 2300)   ondansetron (ZOFRAN) injection 4 mg (4 mg Intravenous Given 1/3/18 2301)   fentanyl citrate (PF) 100 MCG/2ML 100 mcg (100 mcg Intravenous Given 1/3/18 2301)   iohexol (OMNIPAQUE) 350 MG/ML injection (MULTI-DOSE) 100 mL (100 mL Intravenous Given 1/3/18 2331)       Diagnostic Studies  Results Reviewed     Procedure Component Value Units Date/Time    Lactic acid, plasma [[id number]]  (Normal) Collected:  01/03/18 2256    Lab Status:  Final result Specimen:  Blood from Hand, Right Updated:  01/03/18 2321     LACTIC ACID 1 4 mmol/L     Narrative:         Result may be elevated if tourniquet was used during collection      Basic metabolic panel [[id number]]  (Abnormal) Collected:  01/03/18 2256    Lab Status:  Final result Specimen:  Blood from Hand, Right Updated:  01/03/18 2318     Sodium 134 (L) mmol/L      Potassium 3 7 mmol/L      Chloride 98 (L) mmol/L      CO2 24 mmol/L      Anion Gap 12 mmol/L      BUN 12 mg/dL      Creatinine 0 69 mg/dL      Glucose 184 (H) mg/dL      Calcium 8 9 mg/dL      eGFR 94 ml/min/1 73sq m     Narrative:         National Kidney Disease Education Program recommendations are as follows:  GFR calculation is accurate only with a steady state creatinine  Chronic Kidney disease less than 60 ml/min/1 73 sq  meters  Kidney failure less than 15 ml/min/1 73 sq  meters  Hepatic function panel [[id number]]  (Abnormal) Collected:  01/03/18 2256    Lab Status:  Final result Specimen:  Blood from Hand, Right Updated:  01/03/18 2318     Total Bilirubin 1 00 mg/dL      Bilirubin, Direct 0 26 (H) mg/dL      Alkaline Phosphatase 77 U/L      AST 12 U/L      ALT 22 U/L      Total Protein 7 5 g/dL      Albumin 3 2 (L) g/dL     Lipase [[id number]]  (Abnormal) Collected:  01/03/18 2256    Lab Status:  Final result Specimen:  Blood from Hand, Right Updated:  01/03/18 2318     Lipase 36 (L) u/L     Blood culture [[id number]] Collected:  01/03/18 2300    Lab Status: In process Specimen:  Blood from Arm, Right Updated:  01/03/18 2310    CBC and differential [[id number]]  (Abnormal) Collected:  01/03/18 2256    Lab Status:  Final result Specimen:  Blood from Hand, Right Updated:  01/03/18 2302     WBC 27 00 (H) Thousand/uL      RBC 4 77 Million/uL      Hemoglobin 14 2 g/dL      Hematocrit 42 0 %      MCV 88 fL      MCH 29 8 pg      MCHC 33 8 g/dL      RDW 13 2 %      MPV 9 3 fL      Platelets 111 Thousands/uL      nRBC 0 /100 WBCs      Neutrophils Relative 85 (H) %      Lymphocytes Relative 6 (L) %      Monocytes Relative 8 %      Eosinophils Relative 0 %      Basophils Relative 0 %      Neutrophils Absolute 23 01 (H) Thousands/µL      Lymphocytes Absolute 1 61 Thousands/µL      Monocytes Absolute 2 07 (H) Thousand/µL      Eosinophils Absolute 0 01 Thousand/µL      Basophils Absolute 0 07 Thousands/µL     Blood culture [[id number]] Collected:  01/03/18 2237    Lab Status: In process Specimen:  Blood from Hand, Left Updated:  01/03/18 2242                 CT abdomen pelvis w contrast   Final Result by Cuco Potter MD (01/04 0004)         1   Cellulitis involving the right aspect of the vagina and labia majora  2 4 cm lesion may represent phlegmon versus organizing abscess  2  Extensive inflammation and gas in the right aspect of the perineum and inferior gluteal soft tissues, concerning for necrotizing fasciitis versus postsurgical change from recent incision and drainage  No evidence of organized abscess in the perineum  3  No lymphadenopathy, free intraperitoneal air or collection  Workstation performed: [id number]                    Procedures  ECG 12 Lead Documentation  Date/Time: 1/3/2018 10:01 PM  Performed by: Erika Garcia  Authorized by: Erika Garcia     Indications / Diagnosis:  Tachycardia  Patient location:  ED  Previous ECG:     Previous ECG:  Unavailable  Interpretation:     Interpretation: non-specific    Rate:     ECG rate:  One hundred eleven    ECG rate assessment: tachycardic    Rhythm:     Rhythm: sinus tachycardia    Ectopy:     Ectopy: none    QRS:     QRS axis:  Normal  ST segments:     ST segments:  Non-specific  Comments:       no specific ST-T wave changes  Incision/Drainage  Date/Time: 1/3/2018 11:07 PM  Performed by: Mliss Schirmer by: Erika Garcia     Patient location:  ED  Other Assisting Provider: Yes (comment)    Consent:     Consent obtained:  Verbal    Consent given by:  Patient    Risks discussed:  Bleeding and incomplete drainage  Universal protocol:     Procedure explained and questions answered to patient or proxy's satisfaction: yes      Patient identity confirmed:  Verbally with patient  Location:     Type:  Abscess    Location:  Anogenital    Anogenital location:  Perineum  Pre-procedure details:     Skin preparation:  Betadine  Sedation:     Sedation type: Anxiolysis  Anesthesia (see MAR for exact dosages):      Anesthesia method:  Local infiltration    Local anesthetic:  Lidocaine 2% w/o epi  Procedure details:     Complexity:  Intermediate    Incision types:  Single straight    Scalpel blade:  11    Approach:  Open    Incision depth: Subcutaneous    Wound management:  Probed and deloculated    Drainage:  Purulent    Wound treatment:  Wound left open    Packing materials:  None  Post-procedure details:     Patient tolerance of procedure: Tolerated well, no immediate complications  Comments:      Incision and drainage of abscess with good drainage after anesthetized with lidocaine  Phone Contacts  ED Phone Contact    ED Course  ED Course         diagnostic testing showed a normal lactate at 1 4, no sign of severe sepsis at this time, patient's electrolytes showed a blood sugar 184, patient reports she is prediabetic, bicarb was normal no sign of severe acidosis, patient's electrolytes otherwise were normal normal renal function, patient had normal liver functions  White count was markedly elevated 27,000 consistent with infection  Lipase was normal no sign of pancreatitis  CT scan showed a cellulitis involving the right aspect of the vagina and labia majora 2 4 cm lesion with the consistent with an organized abscess there was gas within the perineum and there was some gas within the gluteal area  MDM medical decision making 71-year-old female presents with nausea and vomiting, patient reports taking Percocet after having a left humeral fracture, reports she believes the Percocet is nauseating her  Patient also has some fever and chills at home, exam revealed a swollen area in her right gluteal perineal region consistent with abscess, there was incision and drainage, CT shows fairly extensive abscess with gas in the soft tissues  There was concern for progressive infection  The patient is nontoxic here but may require surgical debridement of that area  I called [facility], patient may require extensive surgical debridement    Discussed with Dr Tino Maldonado; discussed with [facility] surgical service [facility] Time    Disposition  Final diagnoses:   Gluteal abscess   Cellulitis   Left humeral fracture     Time reflects when diagnosis was documented in both MDM as applicable and the Disposition within this note     Time User Action Codes Description Comment    1/4/2018 12:18 AM Mell Nida Add [L02 31] Gluteal abscess     1/4/2018 12:18 AM Mell Nida Add [L03 90] Cellulitis     1/4/2018  1:17 AM Mell Nida Add Cole Rivero Left humeral fracture       ED Disposition     ED Disposition Condition Comment    Transfer to Another [address] [place] [address] should be transferred out to [facility], surgical service, Dr Wally ANG Documentation    Moo Orts Most Recent Value   Patient Condition  The patient has been stabilized such that within reasonable medical probability, no material deterioration of the patient condition or the condition of the unborn child(elisa) is likely to result from the transfer   Reason for Transfer  Level of Care needed not available at this facility   Benefits of Transfer  Specialized equipment and/or services available at the receiving facility (Include comment)________________________   Risks of Transfer  Potential for delay in receiving treatment   Accepting Physician  Dr Lauren Maddox Name, [address]    (Name & Tel number)  Patient access center   Transported by (Company and Unit #)  [facility]   Sending MD Dr Marla Colmenares   Provider Certification  General risk, such as traffic hazards, adverse weather conditions, rough terrain or turbulence, possible failure of equipment (including vehicle or aircraft), or consequences of actions of persons outside the control of the transport personnel      RN Documentation    [address] Her[address] Name, [address]    (Name & Tel number)  Patient access center   Transported by (Company and Unit #)  [facility] emergency transport      Follow-up Information None       Patient's Medications    No medications on file     No discharge procedures on file      ED Provider  Electronically Signed by           Tim Duvall MD  01/04/18 0757

## 2018-01-04 NOTE — PLAN OF CARE
Discharge to home or other facility with appropriate resources Progressing      Absence or prevention of progression during hospitalization Progressing      Absence of fever/infection during neutropenic period Progressing      Patient/family/caregiver demonstrates understanding of disease process, treatment plan, medications, and discharge instructions Progressing      Maintain or return mobility to safest level of function Progressing      Maintain proper alignment of affected body part Progressing      Verbalizes/displays adequate comfort level or baseline comfort level Progressing      Patient will remain free of falls Progressing      Skin integrity is maintained or improved Progressing      Maintain or return to baseline ADL function Progressing      Maintain or return mobility status to optimal level Progressing      Skin integrity remains intact Progressing      Incision(s), wounds(s) or drain site(s) healing without S/S of infection Progressing      Oral mucous membranes remain intact Progressing

## 2018-01-04 NOTE — PERIOPERATIVE NURSING NOTE
VSS, pt denies pain or nausea, assessment unchanged, report called, no questions, pt transferred to floor via bed

## 2018-01-04 NOTE — ANESTHESIA POSTPROCEDURE EVALUATION
Post-Op Assessment Note      CV Status:  Stable    Mental Status:  Alert and awake    Hydration Status:  Euvolemic    PONV Controlled:  Controlled    Airway Patency:  Patent    Post Op Vitals Reviewed: Yes          Staff: Anesthesiologist, CRNA       Comments: Awake, reflexes intact, VSS          /63 (01/04/18 0609)    Temp 98 °F (36 7 °C) (01/04/18 0609)    Pulse 103 (01/04/18 0609)   Resp 16 (01/04/18 0609)    SpO2 95 % (01/04/18 0609)

## 2018-01-04 NOTE — EMTALA/ACUTE CARE TRANSFER
[address] [address]  Dept: [phone]      EMTALA TRANSFER CONSENT    NAME Renetta Danielle                                         1955                              MRN [id number]    I have been informed of my rights regarding examination, treatment, and transfer   by Dr Cary Harris MD    Benefits: Specialized equipment and/or services available at the receiving facility (Include comment)________________________    Risks: Potential for delay in receiving treatment      Transfer Request   I acknowledge that my medical condition has been evaluated and explained to me by the emergency department physician or other qualified medical person and/or my attending physician who has recommended and offered to me further medical examination and treatment  I understand the Hospital's obligation with respect to the treatment and stabilization of my emergency medical condition  I nevertheless request to be transferred  I release the Hospital, the doctor, and any other persons caring for me from all responsibility or liability for any injury or ill effects that may result from my transfer and agree to accept all responsibility for the consequences of my choice to transfer, rather than receive stabilizing treatment at the Hospital  I understand that because the transfer is my request, my insurance may not provide reimbursement for the services  The Hospital will assist and direct me and my family in how to make arrangements for transfer, but the hospital is not liable for any fees charged by the transport service  In spite of this understanding, I refuse to consent to further medical examination and treatment which has been offered to me, and request transfer to  Tyrell Frost Name, [address] : [facility]   I authorize the performance of emergency medical procedures and treatments upon me in both transit and upon arrival at the receiving facility  Additionally, I authorize the release of any and all medical records to the receiving facility and request they be transported with me, if possible  I authorize the performance of emergency medical procedures and treatments upon me in both transit and upon arrival at the receiving facility  Additionally, I authorize the release of any and all medical records to the receiving facility and request they be transported with me, if possible  I understand that the safest mode of transportation during a medical emergency is an ambulance and that the Hospital advocates the use of this mode of transport  Risks of traveling to the receiving facility by car, including absence of medical control, life sustaining equipment, such as oxygen, and medical personnel has been explained to me and I fully understand them  (TORRI CORRECT BOX BELOW)  [  ]  I consent to the stated transfer and to be transported by ambulance/helicopter  [  ]  I consent to the stated transfer, but refuse transportation by ambulance and accept full responsibility for my transportation by car  I understand the risks of non-ambulance transfers and I exonerate the Hospital and its staff from any deterioration in my condition that results from this refusal     X___________________________________________    DATE  18  TIME________  Signature of patient or legally responsible individual signing on patient behalf           RELATIONSHIP TO PATIENT_________________________          Provider Certification    NAME Joselito Espinal                                        [facility] 1955                              MRN [id number]    A medical screening exam was performed on the above named patient  Based on the examination:    Condition Necessitating Transfer The primary encounter diagnosis was Gluteal abscess  A diagnosis of Cellulitis was also pertinent to this visit      Patient Condition: The patient has been stabilized such that within reasonable medical probability, no material deterioration of the patient condition or the condition of the unborn child(elisa) is likely to result from the transfer    Reason for Transfer: Level of Care needed not available at this facility    Transfer Requirements: [facility] [address]   · Space available and qualified personnel available for treatment as acknowledged by Patient access center  · Agreed to accept transfer and to provide appropriate medical treatment as acknowledged by       Dr Carrie Conklin  · Appropriate medical records of the examination and treatment of the patient are provided at the time of transfer   [address] _______  · Transfer will be performed by qualified personnel from [facility] emergency transport  and appropriate transfer equipment as required, including the use of necessary and appropriate life support measures  Provider Certification: I have examined the patient and explained the following risks and benefits of being transferred/refusing transfer to the patient/family:  General risk, such as traffic hazards, adverse weather conditions, rough terrain or turbulence, possible failure of equipment (including vehicle or aircraft), or consequences of actions of persons outside the control of the transport personnel      Based on these reasonable risks and benefits to the patient and/or the unborn child(elisa), and based upon the information available at the time of the patients examination, I certify that the medical benefits reasonably to be expected from the provision of appropriate medical treatments at another medical facility outweigh the increasing risks, if any, to the individuals medical condition, and in the case of labor to the unborn child, from effecting the transfer      X____________________________________________ DATE 01/04/18        TIME_______      ORIGINAL - SEND TO MEDICAL RECORDS   COPY - SEND WITH PATIENT DURING TRANSFER

## 2018-01-05 LAB
BASOPHILS # BLD AUTO: 0.03 THOUSANDS/ΜL (ref 0–0.1)
BASOPHILS NFR BLD AUTO: 0 % (ref 0–1)
EOSINOPHIL # BLD AUTO: 0.24 THOUSAND/ΜL (ref 0–0.61)
EOSINOPHIL NFR BLD AUTO: 2 % (ref 0–6)
ERYTHROCYTE [DISTWIDTH] IN BLOOD BY AUTOMATED COUNT: 13.6 % (ref 11.6–15.1)
HCT VFR BLD AUTO: 36.5 % (ref 34.8–46.1)
HGB BLD-MCNC: 12.2 G/DL (ref 11.5–15.4)
LYMPHOCYTES # BLD AUTO: 1.88 THOUSANDS/ΜL (ref 0.6–4.47)
LYMPHOCYTES NFR BLD AUTO: 14 % (ref 14–44)
MCH RBC QN AUTO: 30.2 PG (ref 26.8–34.3)
MCHC RBC AUTO-ENTMCNC: 33.4 G/DL (ref 31.4–37.4)
MCV RBC AUTO: 90 FL (ref 82–98)
MONOCYTES # BLD AUTO: 0.95 THOUSAND/ΜL (ref 0.17–1.22)
MONOCYTES NFR BLD AUTO: 7 % (ref 4–12)
NEUTROPHILS # BLD AUTO: 10.6 THOUSANDS/ΜL (ref 1.85–7.62)
NEUTS SEG NFR BLD AUTO: 77 % (ref 43–75)
NRBC BLD AUTO-RTO: 0 /100 WBCS
PLATELET # BLD AUTO: 206 THOUSANDS/UL (ref 149–390)
PMV BLD AUTO: 9.5 FL (ref 8.9–12.7)
RBC # BLD AUTO: 4.04 MILLION/UL (ref 3.81–5.12)
WBC # BLD AUTO: 13.74 THOUSAND/UL (ref 4.31–10.16)

## 2018-01-05 PROCEDURE — 85025 COMPLETE CBC W/AUTO DIFF WBC: CPT | Performed by: SURGERY

## 2018-01-05 RX ORDER — FENTANYL CITRATE 50 UG/ML
25 INJECTION, SOLUTION INTRAMUSCULAR; INTRAVENOUS
Status: DISCONTINUED | OUTPATIENT
Start: 2018-01-05 | End: 2018-01-05

## 2018-01-05 RX ORDER — DOCUSATE SODIUM 100 MG/1
100 CAPSULE, LIQUID FILLED ORAL 2 TIMES DAILY
Status: DISCONTINUED | OUTPATIENT
Start: 2018-01-05 | End: 2018-01-09 | Stop reason: HOSPADM

## 2018-01-05 RX ORDER — OXYCODONE HYDROCHLORIDE 10 MG/1
10 TABLET ORAL EVERY 4 HOURS PRN
Status: DISCONTINUED | OUTPATIENT
Start: 2018-01-05 | End: 2018-01-09 | Stop reason: HOSPADM

## 2018-01-05 RX ORDER — OXYCODONE HYDROCHLORIDE 5 MG/1
5 TABLET ORAL EVERY 4 HOURS PRN
Status: DISCONTINUED | OUTPATIENT
Start: 2018-01-05 | End: 2018-01-05

## 2018-01-05 RX ORDER — ACETAMINOPHEN 325 MG/1
975 TABLET ORAL EVERY 8 HOURS SCHEDULED
Status: DISCONTINUED | OUTPATIENT
Start: 2018-01-05 | End: 2018-01-09 | Stop reason: HOSPADM

## 2018-01-05 RX ORDER — FENTANYL CITRATE 50 UG/ML
25 INJECTION, SOLUTION INTRAMUSCULAR; INTRAVENOUS EVERY 2 HOUR PRN
Status: DISCONTINUED | OUTPATIENT
Start: 2018-01-05 | End: 2018-01-09 | Stop reason: HOSPADM

## 2018-01-05 RX ORDER — OXYCODONE HYDROCHLORIDE 5 MG/1
5 TABLET ORAL EVERY 4 HOURS PRN
Status: DISCONTINUED | OUTPATIENT
Start: 2018-01-05 | End: 2018-01-09 | Stop reason: HOSPADM

## 2018-01-05 RX ORDER — OXYCODONE HYDROCHLORIDE 10 MG/1
10 TABLET ORAL EVERY 4 HOURS PRN
Status: DISCONTINUED | OUTPATIENT
Start: 2018-01-05 | End: 2018-01-05

## 2018-01-05 RX ADMIN — VANCOMYCIN HYDROCHLORIDE 2000 MG: 10 INJECTION, POWDER, LYOPHILIZED, FOR SOLUTION INTRAVENOUS at 21:56

## 2018-01-05 RX ADMIN — OXYCODONE HYDROCHLORIDE 5 MG: 5 TABLET ORAL at 19:42

## 2018-01-05 RX ADMIN — HEPARIN SODIUM 5000 UNITS: 5000 INJECTION, SOLUTION INTRAVENOUS; SUBCUTANEOUS at 06:35

## 2018-01-05 RX ADMIN — Medication 2000 MG: at 01:23

## 2018-01-05 RX ADMIN — VANCOMYCIN HYDROCHLORIDE 2000 MG: 10 INJECTION, POWDER, LYOPHILIZED, FOR SOLUTION INTRAVENOUS at 06:35

## 2018-01-05 RX ADMIN — OXYCODONE HYDROCHLORIDE 5 MG: 5 TABLET ORAL at 23:42

## 2018-01-05 RX ADMIN — ACETAMINOPHEN 975 MG: 325 TABLET, FILM COATED ORAL at 13:22

## 2018-01-05 RX ADMIN — DIPHENHYDRAMINE HYDROCHLORIDE 50 MG: 50 INJECTION, SOLUTION INTRAMUSCULAR; INTRAVENOUS at 02:25

## 2018-01-05 RX ADMIN — ACETAMINOPHEN 975 MG: 325 TABLET, FILM COATED ORAL at 21:56

## 2018-01-05 RX ADMIN — Medication 2000 MG: at 12:39

## 2018-01-05 RX ADMIN — CLINDAMYCIN PHOSPHATE 600 MG: 12 INJECTION, SOLUTION INTRAMUSCULAR; INTRAVENOUS at 13:22

## 2018-01-05 RX ADMIN — DOCUSATE SODIUM 100 MG: 100 CAPSULE, LIQUID FILLED ORAL at 19:00

## 2018-01-05 RX ADMIN — FENTANYL CITRATE 25 MCG: 50 INJECTION INTRAMUSCULAR; INTRAVENOUS at 01:19

## 2018-01-05 RX ADMIN — HEPARIN SODIUM 5000 UNITS: 5000 INJECTION, SOLUTION INTRAVENOUS; SUBCUTANEOUS at 21:56

## 2018-01-05 RX ADMIN — FENTANYL CITRATE 25 MCG: 50 INJECTION INTRAMUSCULAR; INTRAVENOUS at 13:11

## 2018-01-05 RX ADMIN — HEPARIN SODIUM 5000 UNITS: 5000 INJECTION, SOLUTION INTRAVENOUS; SUBCUTANEOUS at 13:13

## 2018-01-05 RX ADMIN — CLINDAMYCIN PHOSPHATE 600 MG: 12 INJECTION, SOLUTION INTRAMUSCULAR; INTRAVENOUS at 06:34

## 2018-01-05 RX ADMIN — DOCUSATE SODIUM 100 MG: 100 CAPSULE, LIQUID FILLED ORAL at 12:39

## 2018-01-05 NOTE — MALNUTRITION/BMI
This medical record reflects one or more clinical indicators suggestive of malnutrition and/or morbid obesity  Please indicate the one diagnosis below which you feel best reflects the clinical picture  BMI Findings:  40-44 9  Morbid obesity  Body mass index is 44 25 kg/m²  See Nutrition note dated 1/5/18 for additional details  Completed nutrition assessment is viewable in the nutrition documentation

## 2018-01-05 NOTE — PROGRESS NOTES
Progress Note - General Surgery   Dayana Magaña 65 y o  female MRN: [id number]  Unit/Bed#: [facility] 928-01 Encounter: [id number]    Assessment and Plan:  Patient Active Problem List   Diagnosis    Soft tissue infection       Assessment:  66 y/o F status post I&D and debridement of right perineal wound on 1/4/18    Plan:  Regular diet  Care as per primary team   Dressing change today w/ removal of packing   SQH Venodynes for DVT ppx       Subjective: No acute events overnight - Pt reports she is in pain but is tolerable  Objective:       Vitals   Vitals:    01/04/18 1506 01/04/18 1900 01/04/18 2310 01/05/18 0715   BP: 109/57 111/65 106/54 116/57   Pulse: 78 87 81 76   Resp: 18 17 16 18   Temp: 98 2 °F (36 8 °C) 98 °F (36 7 °C) 98 8 °F (37 1 °C) 98 8 °F (37 1 °C)   TempSrc: Oral Oral Oral Oral   SpO2: 94% 98% 95% 98%   Weight:       Height:         Temp  Min: 86 °F (30 °C)  Max: 99 2 °F (37 3 °C)  IBW: 63 9 kg   Body mass index is 44 25 kg/m²  I/O   I/O       01/03 0701 - 01/04 0700 01/04 0701 - 01/05 0700 01/05 0701 - 01/06 0700    P  O   760 300    I V  (mL/kg) 200 (1 5) 512 5 (3 9)     IV Piggyback  86 7     Total Intake(mL/kg) 200 (1 5) 1359 2 (10 3) 300 (2 3)    Urine (mL/kg/hr) 350 1150 (0 4) 175 (0 4)    Blood 5      Total Output 355 1150 175    Net -155 +209 2 +125                 Intake/Output Summary (Last 24 hours) at 01/05/18 1046  Last data filed at 01/05/18 0956   Gross per 24 hour   Intake          1419 17 ml   Output             1325 ml   Net            94 17 ml       Invasive  Devices  Invasive Devices     Peripheral Intravenous Line            Peripheral IV 01/03/18 Right Hand 1 day    Peripheral IV 01/04/18 Right Wrist 1 day          Drain            Urethral Catheter Latex 16 Fr  1 day                Active medications  Scheduled Meds:  cefepime 2,000 mg Intravenous Q12H   clindamycin 600 mg Intravenous Q8H   heparin (porcine) 5,000 Units Subcutaneous Q8H [facility]   vancomycin 15 mg/kg Intravenous Q12H     Continuous Infusions:     PRN Meds:     acetaminophen 650 mg Q6H PRN   diphenhydrAMINE (BENADRYL) IVPB 50 mg Q6H PRN   fentanyl citrate (PF) 25 mcg Q1H PRN   ondansetron 8 mg Q6H PRN       Physical Exam: /57   Pulse 76   Temp 98 8 °F (37 1 °C) (Oral)   Resp 18   Ht 5' 8" (1 727 m)   Wt 132 kg (291 lb 0 1 oz)   SpO2 98%   BMI 44 25 kg/m²     Physical Exam:  General Appearance: Alert, cooperative, no distress, appears stated age  Lungs: Clear to auscultation bilaterally, respirations unlablored   Heart: Regular rate and rhythm, S1 and S2 normal, no murmur, rub or gallop  Abdomen: Soft, non-tender, non distended, bowel sounds active in all four quadrants,   No masses or organomegaly  Wound packing present in pt's labia - no surrounding erythema noted on the pts thigh    Laboratory and Diagnostics:    Results from last 7 days  Lab Units 01/05/18  0536 01/04/18  0635 01/04/18  0424 01/03/18  2256   WBC Thousand/uL 13 74* 14 97*  --  27 00*   HEMOGLOBIN g/dL 12 2 13 2  --  14 2   HEMATOCRIT % 36 5 39 0  --  42 0   PLATELETS Thousands/uL 206 207 228 248   NEUTROS PCT % 77*  --   --  85*   MONOS PCT % 7  --   --  8       Results from last 7 days  Lab Units 01/04/18  0635 01/04/18  0424 01/03/18  2256   SODIUM mmol/L 134* 134* 134*   POTASSIUM mmol/L 3 7 3 5 3 7   CHLORIDE mmol/L 104 101 98*   CO2 mmol/L 21 24 24   BUN mg/dL 11 11 12   CREATININE mg/dL 0 69 0 64 0 69   CALCIUM mg/dL 8 1* 8 6 8 9   TOTAL PROTEIN g/dL  --   --  7 5   BILIRUBIN TOTAL mg/dL  --   --  1 00   ALK PHOS U/L  --   --  77   ALT U/L  --   --  22   AST U/L  --   --  12   GLUCOSE RANDOM mg/dL 160* 157* 184*         No results found for: PHOS       No results found for: TROPONINT  ABG:No results found for: PHART, ELD4PSA, PO2ART, WYG6BIY, D2ILIDWQ, BEART, SOURCE    Blood Culture:   Lab Results   Component Value Date    BLOODCX No Growth at 24 hrs  01/03/2018    BLOODCX No Growth at 24 hrs  01/03/2018     Urine Culture: No results found for: URINECX  Sputum Culture: No components found for: SPUTUMCX    Imaging: I have personally reviewed pertinent reports     and I have personally reviewed pertinent films in PACS    VTE Pharmacologic Prophylaxis: SQH  VTE Mechanical Prophylaxis: sequential compression device

## 2018-01-05 NOTE — PLAN OF CARE
DISCHARGE PLANNING     Discharge to home or other facility with appropriate resources Progressing        DISCHARGE PLANNING - CARE MANAGEMENT     Discharge to post-acute care or home with appropriate resources Progressing        INFECTION - ADULT     Absence or prevention of progression during hospitalization Progressing     Absence of fever/infection during neutropenic period Progressing        Knowledge Deficit     Patient/family/caregiver demonstrates understanding of disease process, treatment plan, medications, and discharge instructions Progressing        MUSCULOSKELETAL - ADULT     Maintain or return mobility to safest level of function Progressing     Maintain proper alignment of affected body part Progressing        PAIN - ADULT     Verbalizes/displays adequate comfort level or baseline comfort level Progressing        Potential for Falls     Patient will remain free of falls Progressing        Prexisting or High Potential for Compromised Skin Integrity     Skin integrity is maintained or improved Progressing        SAFETY ADULT     Maintain or return to baseline ADL function Progressing     Maintain or return mobility status to optimal level Progressing        SKIN/TISSUE INTEGRITY - ADULT     Skin integrity remains intact Progressing     Incision(s), wounds(s) or drain site(s) healing without S/S of infection Progressing     Oral mucous membranes remain intact Progressing

## 2018-01-05 NOTE — SOCIAL WORK
Cm reviewed patient during care coordination rounds  Patient is not medically stable for d/c at this time  Cm met with patient at her request to provide Cm with updated information on her medical insurance  Cm informed that medical coverage is still [facility] [employer] [facility] [facility] [place], and that ID number is [id number] and group number is [id number]  Cm sent this information via email to  for updating

## 2018-01-06 LAB
BASOPHILS # BLD AUTO: 0.04 THOUSANDS/ΜL (ref 0–0.1)
BASOPHILS NFR BLD AUTO: 0 % (ref 0–1)
EOSINOPHIL # BLD AUTO: 0.3 THOUSAND/ΜL (ref 0–0.61)
EOSINOPHIL NFR BLD AUTO: 3 % (ref 0–6)
ERYTHROCYTE [DISTWIDTH] IN BLOOD BY AUTOMATED COUNT: 13.6 % (ref 11.6–15.1)
HCT VFR BLD AUTO: 36.9 % (ref 34.8–46.1)
HGB BLD-MCNC: 12.4 G/DL (ref 11.5–15.4)
LYMPHOCYTES # BLD AUTO: 2.24 THOUSANDS/ΜL (ref 0.6–4.47)
LYMPHOCYTES NFR BLD AUTO: 24 % (ref 14–44)
MCH RBC QN AUTO: 30.4 PG (ref 26.8–34.3)
MCHC RBC AUTO-ENTMCNC: 33.6 G/DL (ref 31.4–37.4)
MCV RBC AUTO: 90 FL (ref 82–98)
MONOCYTES # BLD AUTO: 0.79 THOUSAND/ΜL (ref 0.17–1.22)
MONOCYTES NFR BLD AUTO: 8 % (ref 4–12)
NEUTROPHILS # BLD AUTO: 5.97 THOUSANDS/ΜL (ref 1.85–7.62)
NEUTS SEG NFR BLD AUTO: 65 % (ref 43–75)
NRBC BLD AUTO-RTO: 0 /100 WBCS
PLATELET # BLD AUTO: 228 THOUSANDS/UL (ref 149–390)
PMV BLD AUTO: 9.6 FL (ref 8.9–12.7)
RBC # BLD AUTO: 4.08 MILLION/UL (ref 3.81–5.12)
WBC # BLD AUTO: 9.38 THOUSAND/UL (ref 4.31–10.16)

## 2018-01-06 PROCEDURE — 85025 COMPLETE CBC W/AUTO DIFF WBC: CPT | Performed by: SURGERY

## 2018-01-06 RX ORDER — LEVOTHYROXINE SODIUM 0.12 MG/1
125 TABLET ORAL DAILY
Status: DISCONTINUED | OUTPATIENT
Start: 2018-01-06 | End: 2018-01-09 | Stop reason: HOSPADM

## 2018-01-06 RX ORDER — DIPHENHYDRAMINE HCL 25 MG
25 TABLET ORAL EVERY 6 HOURS PRN
Status: DISCONTINUED | OUTPATIENT
Start: 2018-01-06 | End: 2018-01-09 | Stop reason: HOSPADM

## 2018-01-06 RX ADMIN — HEPARIN SODIUM 5000 UNITS: 5000 INJECTION, SOLUTION INTRAVENOUS; SUBCUTANEOUS at 06:01

## 2018-01-06 RX ADMIN — VANCOMYCIN HYDROCHLORIDE 2000 MG: 10 INJECTION, POWDER, LYOPHILIZED, FOR SOLUTION INTRAVENOUS at 09:22

## 2018-01-06 RX ADMIN — CLINDAMYCIN PHOSPHATE 600 MG: 12 INJECTION, SOLUTION INTRAMUSCULAR; INTRAVENOUS at 06:21

## 2018-01-06 RX ADMIN — ACETAMINOPHEN 975 MG: 325 TABLET, FILM COATED ORAL at 06:01

## 2018-01-06 RX ADMIN — Medication 2000 MG: at 13:52

## 2018-01-06 RX ADMIN — CLINDAMYCIN PHOSPHATE 600 MG: 12 INJECTION, SOLUTION INTRAMUSCULAR; INTRAVENOUS at 00:16

## 2018-01-06 RX ADMIN — ACETAMINOPHEN 975 MG: 325 TABLET, FILM COATED ORAL at 13:57

## 2018-01-06 RX ADMIN — VANCOMYCIN HYDROCHLORIDE 2000 MG: 10 INJECTION, POWDER, LYOPHILIZED, FOR SOLUTION INTRAVENOUS at 21:26

## 2018-01-06 RX ADMIN — ACETAMINOPHEN 975 MG: 325 TABLET, FILM COATED ORAL at 22:23

## 2018-01-06 RX ADMIN — OXYCODONE HYDROCHLORIDE 10 MG: 10 TABLET ORAL at 22:23

## 2018-01-06 RX ADMIN — Medication 2000 MG: at 01:24

## 2018-01-06 RX ADMIN — CLINDAMYCIN PHOSPHATE 600 MG: 12 INJECTION, SOLUTION INTRAMUSCULAR; INTRAVENOUS at 14:57

## 2018-01-06 RX ADMIN — DIPHENHYDRAMINE HCL 25 MG: 25 TABLET ORAL at 01:35

## 2018-01-06 RX ADMIN — HEPARIN SODIUM 5000 UNITS: 5000 INJECTION, SOLUTION INTRAVENOUS; SUBCUTANEOUS at 13:57

## 2018-01-06 RX ADMIN — HEPARIN SODIUM 5000 UNITS: 5000 INJECTION, SOLUTION INTRAVENOUS; SUBCUTANEOUS at 22:23

## 2018-01-06 RX ADMIN — LEVOTHYROXINE SODIUM 125 MCG: 125 TABLET ORAL at 09:20

## 2018-01-06 RX ADMIN — DOCUSATE SODIUM 100 MG: 100 CAPSULE, LIQUID FILLED ORAL at 18:25

## 2018-01-06 RX ADMIN — OXYCODONE HYDROCHLORIDE 10 MG: 10 TABLET ORAL at 12:18

## 2018-01-06 RX ADMIN — DOCUSATE SODIUM 100 MG: 100 CAPSULE, LIQUID FILLED ORAL at 09:20

## 2018-01-06 NOTE — PLAN OF CARE
DISCHARGE PLANNING     Discharge to home or other facility with appropriate resources Progressing        DISCHARGE PLANNING - CARE MANAGEMENT     Discharge to post-acute care or home with appropriate resources Progressing        INFECTION - ADULT     Absence or prevention of progression during hospitalization Progressing     Absence of fever/infection during neutropenic period Progressing        Knowledge Deficit     Patient/family/caregiver demonstrates understanding of disease process, treatment plan, medications, and discharge instructions Progressing        MUSCULOSKELETAL - ADULT     Maintain or return mobility to safest level of function Progressing     Maintain proper alignment of affected body part Progressing        Nutrition/Hydration-ADULT     Nutrient/Hydration intake appropriate for improving, restoring or maintaining nutritional needs Progressing        PAIN - ADULT     Verbalizes/displays adequate comfort level or baseline comfort level Progressing        Potential for Falls     Patient will remain free of falls Progressing        Prexisting or High Potential for Compromised Skin Integrity     Skin integrity is maintained or improved Progressing        SAFETY ADULT     Maintain or return to baseline ADL function Progressing     Maintain or return mobility status to optimal level Progressing        SKIN/TISSUE INTEGRITY - ADULT     Skin integrity remains intact Progressing     Incision(s), wounds(s) or drain site(s) healing without S/S of infection Progressing     Oral mucous membranes remain intact Progressing

## 2018-01-06 NOTE — PROGRESS NOTES
Progress Note - General Surgery   Radha Monk 65 y o  female MRN: [id number]  Unit/Bed#: [facility] 928-01 Encounter: [id number]    Assessment:  64 yo F with R labial abscess s/p incision and drainage 1/4/17    Plan:  - diet as tolerated  - packing changes daily by red surgery  - OOB, ambulate  - continue cefepime/vanc/clinda until cultures and sensitivities return  - DVT ppx -- SQH    Subjective/Objective     Subjective: Patient feels well  Trouble sleeping overnight, but otherwise comfortable  Objective:     Vitals: Blood pressure 115/62, pulse 62, temperature 98 3 °F (36 8 °C), temperature source Oral, resp  rate 16, height 5' 8" (1 727 m), weight 132 kg (291 lb 0 1 oz), SpO2 96 %  ,Body mass index is 44 25 kg/m²  I/O       01/04 0701 - 01/05 0700 01/05 0701 - 01/06 0700 01/06 0701 - 01/07 0700    P  O  760 620     I V  (mL/kg) 512 5 (3 9)      IV Piggyback 86 7      Total Intake(mL/kg) 1359 2 (10 3) 620 (4 7)     Urine (mL/kg/hr) 1150 (0 4) 2575 (0 8)     Blood       Total Output 1150 2575      Net +209 2 -1955                   Physical Exam:  GEN: NAD  HEENT: MMM  CV: RRR  Lung: Normal effort  Ab: Soft, NT/ND  Extrem: No CCE, R labial packing intact, minimal erythema  No tenderness  Neuro:  A+Ox3    Lab, Imaging and other studies:   CBC with diff:   Lab Results   Component Value Date    WBC 9 38 01/06/2018    HGB 12 4 01/06/2018    HCT 36 9 01/06/2018    MCV 90 01/06/2018     01/06/2018    MCH 30 4 01/06/2018    MCHC 33 6 01/06/2018    RDW 13 6 01/06/2018    MPV 9 6 01/06/2018    NRBC 0 01/06/2018   , BMP/CMP: No results found for: NA, K, CL, CO2, ANIONGAP, BUN, CREATININE, GLUCOSE, CALCIUM, AST, ALT, ALKPHOS, PROT, ALBUMIN, BILITOT, EGFR, Magnesium: No results found for: MAG  VTE Pharmacologic Prophylaxis: Heparin  VTE Mechanical Prophylaxis: sequential compression device

## 2018-01-07 LAB
BASOPHILS # BLD AUTO: 0.05 THOUSANDS/ΜL (ref 0–0.1)
BASOPHILS NFR BLD AUTO: 1 % (ref 0–1)
EOSINOPHIL # BLD AUTO: 0.28 THOUSAND/ΜL (ref 0–0.61)
EOSINOPHIL NFR BLD AUTO: 3 % (ref 0–6)
ERYTHROCYTE [DISTWIDTH] IN BLOOD BY AUTOMATED COUNT: 13.5 % (ref 11.6–15.1)
HCT VFR BLD AUTO: 36.9 % (ref 34.8–46.1)
HGB BLD-MCNC: 12.1 G/DL (ref 11.5–15.4)
LYMPHOCYTES # BLD AUTO: 2.44 THOUSANDS/ΜL (ref 0.6–4.47)
LYMPHOCYTES NFR BLD AUTO: 24 % (ref 14–44)
MCH RBC QN AUTO: 29.9 PG (ref 26.8–34.3)
MCHC RBC AUTO-ENTMCNC: 32.8 G/DL (ref 31.4–37.4)
MCV RBC AUTO: 91 FL (ref 82–98)
MONOCYTES # BLD AUTO: 1.04 THOUSAND/ΜL (ref 0.17–1.22)
MONOCYTES NFR BLD AUTO: 10 % (ref 4–12)
NEUTROPHILS # BLD AUTO: 6.12 THOUSANDS/ΜL (ref 1.85–7.62)
NEUTS SEG NFR BLD AUTO: 62 % (ref 43–75)
NRBC BLD AUTO-RTO: 0 /100 WBCS
PLATELET # BLD AUTO: 239 THOUSANDS/UL (ref 149–390)
PMV BLD AUTO: 9.4 FL (ref 8.9–12.7)
RBC # BLD AUTO: 4.05 MILLION/UL (ref 3.81–5.12)
WBC # BLD AUTO: 10.01 THOUSAND/UL (ref 4.31–10.16)

## 2018-01-07 PROCEDURE — 85025 COMPLETE CBC W/AUTO DIFF WBC: CPT | Performed by: SURGERY

## 2018-01-07 PROCEDURE — 94760 N-INVAS EAR/PLS OXIMETRY 1: CPT

## 2018-01-07 PROCEDURE — 94660 CPAP INITIATION&MGMT: CPT

## 2018-01-07 RX ORDER — ZOLPIDEM TARTRATE 5 MG/1
5 TABLET ORAL
Status: DISCONTINUED | OUTPATIENT
Start: 2018-01-07 | End: 2018-01-09 | Stop reason: HOSPADM

## 2018-01-07 RX ORDER — ECHINACEA PURPUREA EXTRACT 125 MG
1 TABLET ORAL
Status: DISCONTINUED | OUTPATIENT
Start: 2018-01-07 | End: 2018-01-09 | Stop reason: HOSPADM

## 2018-01-07 RX ADMIN — ACETAMINOPHEN 975 MG: 325 TABLET, FILM COATED ORAL at 21:51

## 2018-01-07 RX ADMIN — CLINDAMYCIN PHOSPHATE 600 MG: 12 INJECTION, SOLUTION INTRAMUSCULAR; INTRAVENOUS at 00:05

## 2018-01-07 RX ADMIN — ACETAMINOPHEN 975 MG: 325 TABLET, FILM COATED ORAL at 06:08

## 2018-01-07 RX ADMIN — OXYCODONE HYDROCHLORIDE 10 MG: 10 TABLET ORAL at 09:25

## 2018-01-07 RX ADMIN — Medication 1 SPRAY: at 23:11

## 2018-01-07 RX ADMIN — HEPARIN SODIUM 5000 UNITS: 5000 INJECTION, SOLUTION INTRAVENOUS; SUBCUTANEOUS at 21:50

## 2018-01-07 RX ADMIN — HEPARIN SODIUM 5000 UNITS: 5000 INJECTION, SOLUTION INTRAVENOUS; SUBCUTANEOUS at 14:06

## 2018-01-07 RX ADMIN — ACETAMINOPHEN 975 MG: 325 TABLET, FILM COATED ORAL at 14:06

## 2018-01-07 RX ADMIN — CLINDAMYCIN PHOSPHATE 600 MG: 12 INJECTION, SOLUTION INTRAMUSCULAR; INTRAVENOUS at 06:06

## 2018-01-07 RX ADMIN — DOCUSATE SODIUM 100 MG: 100 CAPSULE, LIQUID FILLED ORAL at 09:25

## 2018-01-07 RX ADMIN — DOCUSATE SODIUM 100 MG: 100 CAPSULE, LIQUID FILLED ORAL at 17:44

## 2018-01-07 RX ADMIN — Medication 2000 MG: at 01:29

## 2018-01-07 RX ADMIN — HEPARIN SODIUM 5000 UNITS: 5000 INJECTION, SOLUTION INTRAVENOUS; SUBCUTANEOUS at 06:08

## 2018-01-07 RX ADMIN — DIPHENHYDRAMINE HCL 25 MG: 25 TABLET ORAL at 02:49

## 2018-01-07 RX ADMIN — LEVOTHYROXINE SODIUM 125 MCG: 125 TABLET ORAL at 06:08

## 2018-01-07 RX ADMIN — DIPHENHYDRAMINE HCL 25 MG: 25 TABLET ORAL at 21:51

## 2018-01-07 RX ADMIN — ZOLPIDEM TARTRATE 5 MG: 5 TABLET ORAL at 21:51

## 2018-01-07 NOTE — PROGRESS NOTES
Progress Note - General Surgery   [facility] 65 y o  female MRN: [id number]  Unit/Bed#: [facility] 928-01 Encounter: [id number]    Assessment:  62yo female with morbid obesity presents with R labial abscess now s/p I&D on 1/4/18    Plan:  Diet as tolerated  Stop clindamycin but continue vanco/cefepime  Follow wound cultures and tailor antibiotics accordingly  Ambulate  Daily dressing changes per nursing  Dispo planning    Subjective/Objective   Chief Complaint:     Subjective: No acute distress  Afebrile  Pain controlled    Objective:     Blood pressure 132/60, pulse 71, temperature 97 5 °F (36 4 °C), temperature source Oral, resp  rate 18, height 5' 8" (1 727 m), weight 132 kg (291 lb 0 1 oz), SpO2 97 %  ,Body mass index is 44 25 kg/m²        Intake/Output Summary (Last 24 hours) at 01/07/18 0802  Last data filed at 01/07/18 0653   Gross per 24 hour   Intake             1740 ml   Output             1375 ml   Net              365 ml       Invasive Devices     Peripheral Intravenous Line            Peripheral IV 01/05/18 Right Forearm 1 day          Drain            Urethral Catheter Latex 16 Fr  3 days                Physical Exam:   NAD  RRR  nonlabored respirations  Abdomen obese  R labial wound clean with seropurulent drainage    Lab, Imaging and other studies:  CBC:   Lab Results   Component Value Date    WBC 10 01 01/07/2018    HGB 12 1 01/07/2018    HCT 36 9 01/07/2018    MCV 91 01/07/2018     01/07/2018    MCH 29 9 01/07/2018    MCHC 32 8 01/07/2018    RDW 13 5 01/07/2018    MPV 9 4 01/07/2018    NRBC 0 01/07/2018   , CMP: No results found for: NA, K, CL, CO2, ANIONGAP, BUN, CREATININE, GLUCOSE, CALCIUM, AST, ALT, ALKPHOS, PROT, ALBUMIN, BILITOT, EGFR, Coagulation: No results found for: PT, INR, APTT, Urinalysis: No results found for: COLORU, CLARITYU, SPECGRAV, PHUR, LEUKOCYTESUR, NITRITE, PROTEINUA, GLUCOSEU, KETONESU, BILIRUBINUR, BLOODU, Amylase: No results found for: AMYLASE, Lipase: No results found for: LIPASE  VTE Pharmacologic Prophylaxis: Heparin  VTE Mechanical Prophylaxis: sequential compression device

## 2018-01-07 NOTE — SOCIAL WORK
CM met with Pt to discuss the recommendation of RN services for wound care which she reported being agreeable to with services from [facility]   CM made the requested [place] [phone] referral

## 2018-01-08 LAB — BACTERIA SPEC ANAEROBE CULT: ABNORMAL

## 2018-01-08 RX ADMIN — HEPARIN SODIUM 5000 UNITS: 5000 INJECTION, SOLUTION INTRAVENOUS; SUBCUTANEOUS at 15:20

## 2018-01-08 RX ADMIN — HEPARIN SODIUM 5000 UNITS: 5000 INJECTION, SOLUTION INTRAVENOUS; SUBCUTANEOUS at 05:54

## 2018-01-08 RX ADMIN — CEFAZOLIN SODIUM 2000 MG: 2 SOLUTION INTRAVENOUS at 10:01

## 2018-01-08 RX ADMIN — DOCUSATE SODIUM 100 MG: 100 CAPSULE, LIQUID FILLED ORAL at 17:23

## 2018-01-08 RX ADMIN — LEVOTHYROXINE SODIUM 125 MCG: 125 TABLET ORAL at 06:00

## 2018-01-08 RX ADMIN — DOCUSATE SODIUM 100 MG: 100 CAPSULE, LIQUID FILLED ORAL at 09:16

## 2018-01-08 RX ADMIN — METRONIDAZOLE 500 MG: 500 INJECTION, SOLUTION INTRAVENOUS at 02:55

## 2018-01-08 RX ADMIN — HEPARIN SODIUM 5000 UNITS: 5000 INJECTION, SOLUTION INTRAVENOUS; SUBCUTANEOUS at 22:06

## 2018-01-08 RX ADMIN — Medication 1 SPRAY: at 22:06

## 2018-01-08 RX ADMIN — ACETAMINOPHEN 975 MG: 325 TABLET, FILM COATED ORAL at 05:55

## 2018-01-08 RX ADMIN — ACETAMINOPHEN 975 MG: 325 TABLET, FILM COATED ORAL at 22:05

## 2018-01-08 RX ADMIN — CEFAZOLIN SODIUM 2000 MG: 2 SOLUTION INTRAVENOUS at 23:19

## 2018-01-08 RX ADMIN — METRONIDAZOLE 500 MG: 500 INJECTION, SOLUTION INTRAVENOUS at 17:19

## 2018-01-08 RX ADMIN — CEFAZOLIN SODIUM 2000 MG: 2 SOLUTION INTRAVENOUS at 01:09

## 2018-01-08 RX ADMIN — METRONIDAZOLE 500 MG: 500 INJECTION, SOLUTION INTRAVENOUS at 09:16

## 2018-01-08 RX ADMIN — OXYCODONE HYDROCHLORIDE 5 MG: 5 TABLET ORAL at 10:06

## 2018-01-08 RX ADMIN — CEFAZOLIN SODIUM 2000 MG: 2 SOLUTION INTRAVENOUS at 17:58

## 2018-01-08 RX ADMIN — OXYCODONE HYDROCHLORIDE 10 MG: 10 TABLET ORAL at 22:06

## 2018-01-08 RX ADMIN — ACETAMINOPHEN 975 MG: 325 TABLET, FILM COATED ORAL at 15:20

## 2018-01-08 NOTE — PROGRESS NOTES
Progress Note - General Surgery   Chantelle Ricardo 65 y o  female MRN: [id number]  Unit/Bed#: [facility] 928-01 Encounter: [id number]    Assessment:  62yo female with morbid obesity presents with R labial abscess now s/p I&D on 1/4/18    - Wound cx 2+ GNR, 1+ GPC pairs    Plan:  Daily packing changes per nursing  Continue Ancef Flagyl- will convert to PO on discharge pending cx sensitivities  PRN pain control  CM for VNA services  Discharge once cx speciated  SQH DVT ppx        Subjective/Objective   Chief Complaint: None    Subjective: No complaints, states pain is adequately controlled    Objective:     Blood pressure 128/60, pulse 68, temperature 97 9 °F (36 6 °C), temperature source Oral, resp  rate (!) 188, height 5' 8" (1 727 m), weight 132 kg (291 lb 0 1 oz), SpO2 99 %  ,Body mass index is 44 25 kg/m²  Intake/Output Summary (Last 24 hours) at 01/08/18 4819  Last data filed at 01/08/18 0354   Gross per 24 hour   Intake              825 ml   Output             4275 ml   Net            -3450 ml       Invasive Devices     Peripheral Intravenous Line            Peripheral IV 01/08/18 Right;Dorsal (posterior) Arm less than 1 day              Physical Exam:   Gen: A&O, NAD  Cardio: RRR  Lungs: CTAB  Abd: Soft, non distended, non tender  Groin: Wound to R labia, packing in place   Minimal surrounding erythema      Lab, Imaging and other studies:CBC: No results found for: WBC, HGB, HCT, MCV, PLT, ADJUSTEDWBC, MCH, MCHC, RDW, MPV, NRBC, CMP: No results found for: NA, K, CL, CO2, ANIONGAP, BUN, CREATININE, GLUCOSE, CALCIUM, AST, ALT, ALKPHOS, PROT, ALBUMIN, BILITOT, EGFR  VTE Pharmacologic Prophylaxis: Heparin  VTE Mechanical Prophylaxis: sequential compression device

## 2018-01-09 VITALS
SYSTOLIC BLOOD PRESSURE: 132 MMHG | WEIGHT: 291.01 LBS | OXYGEN SATURATION: 96 % | HEIGHT: 68 IN | RESPIRATION RATE: 18 BRPM | BODY MASS INDEX: 44.1 KG/M2 | TEMPERATURE: 98.5 F | DIASTOLIC BLOOD PRESSURE: 62 MMHG | HEART RATE: 74 BPM

## 2018-01-09 LAB
BACTERIA BLD CULT: NORMAL
BACTERIA BLD CULT: NORMAL
BACTERIA TISS AEROBE CULT: NORMAL
BACTERIA TISS AEROBE CULT: NORMAL
GRAM STN SPEC: NORMAL

## 2018-01-09 PROCEDURE — 94660 CPAP INITIATION&MGMT: CPT

## 2018-01-09 PROCEDURE — 94760 N-INVAS EAR/PLS OXIMETRY 1: CPT

## 2018-01-09 PROCEDURE — 90686 IIV4 VACC NO PRSV 0.5 ML IM: CPT | Performed by: SURGERY

## 2018-01-09 PROCEDURE — 90732 PPSV23 VACC 2 YRS+ SUBQ/IM: CPT | Performed by: SURGERY

## 2018-01-09 RX ORDER — METRONIDAZOLE 500 MG/1
500 TABLET ORAL EVERY 8 HOURS SCHEDULED
Qty: 24 TABLET | Refills: 0 | Status: SHIPPED | OUTPATIENT
Start: 2018-01-09 | End: 2018-01-17

## 2018-01-09 RX ORDER — CEPHALEXIN 500 MG/1
500 CAPSULE ORAL EVERY 6 HOURS SCHEDULED
Qty: 32 CAPSULE | Refills: 0 | Status: SHIPPED | OUTPATIENT
Start: 2018-01-09 | End: 2018-01-17

## 2018-01-09 RX ADMIN — PNEUMOCOCCAL VACCINE POLYVALENT 0.5 ML
25; 25; 25; 25; 25; 25; 25; 25; 25; 25; 25; 25; 25; 25; 25; 25; 25; 25; 25; 25; 25; 25; 25 INJECTION, SOLUTION INTRAMUSCULAR; SUBCUTANEOUS at 14:54

## 2018-01-09 RX ADMIN — HEPARIN SODIUM 5000 UNITS: 5000 INJECTION, SOLUTION INTRAVENOUS; SUBCUTANEOUS at 06:11

## 2018-01-09 RX ADMIN — INFLUENZA VIRUS VACCINE 0.5 ML: 15; 15; 15; 15 SUSPENSION INTRAMUSCULAR at 14:50

## 2018-01-09 RX ADMIN — LEVOTHYROXINE SODIUM 125 MCG: 125 TABLET ORAL at 06:11

## 2018-01-09 RX ADMIN — ACETAMINOPHEN 975 MG: 325 TABLET, FILM COATED ORAL at 06:11

## 2018-01-09 RX ADMIN — METRONIDAZOLE 500 MG: 500 INJECTION, SOLUTION INTRAVENOUS at 09:21

## 2018-01-09 RX ADMIN — ACETAMINOPHEN 975 MG: 325 TABLET, FILM COATED ORAL at 14:17

## 2018-01-09 RX ADMIN — METRONIDAZOLE 500 MG: 500 INJECTION, SOLUTION INTRAVENOUS at 00:17

## 2018-01-09 RX ADMIN — HEPARIN SODIUM 5000 UNITS: 5000 INJECTION, SOLUTION INTRAVENOUS; SUBCUTANEOUS at 13:30

## 2018-01-09 RX ADMIN — Medication 1 SPRAY: at 08:04

## 2018-01-09 RX ADMIN — CEFAZOLIN SODIUM 2000 MG: 2 SOLUTION INTRAVENOUS at 08:09

## 2018-01-09 RX ADMIN — ZOLPIDEM TARTRATE 5 MG: 5 TABLET ORAL at 00:16

## 2018-01-09 RX ADMIN — DOCUSATE SODIUM 100 MG: 100 CAPSULE, LIQUID FILLED ORAL at 08:09

## 2018-01-09 NOTE — DISCHARGE INSTRUCTIONS
A visiting nurse will come to your house to help change the packing  The packing should be changed daily      Shower daily, it is okay to wash the area with soap and water and gently pat dry    Return to hospital if pain worsens, drainage increases, or fever >101    Follow up in 2 weeks for a post op wound check

## 2018-01-09 NOTE — PROGRESS NOTES
Progress Note - General Surgery   Joselito Espinal 65 y o  female MRN: [id number]  [facility] Encounter: [id number]    Assessment:  64yo female with morbid obesity presents with R labial abscess now s/p I&D on 1/4/18    - Wound cx 2+ GNR, 1+ GPC pairs  - Anaerobic cx shows broth growing B fragilis    Plan:  Daily packing changes per nursing  Continue Ancef Flagyl- will convert to PO Keflex/Flagyl today for discharge  PRN pain control  CM for VNA services- in place  Discharge today    Subjective/Objective   Chief Complaint: None    Subjective: No complaints, states pain is adequately controlled    Objective:     Blood pressure 140/81, pulse 66, temperature 98 2 °F (36 8 °C), temperature source Oral, resp  rate 17, height 5' 8" (1 727 m), weight 132 kg (291 lb 0 1 oz), SpO2 100 %  ,Body mass index is 44 25 kg/m²  Intake/Output Summary (Last 24 hours) at 01/09/18 0547  Last data filed at 01/09/18 0047   Gross per 24 hour   Intake             1590 ml   Output             3325 ml   Net            -1735 ml       Invasive Devices     Peripheral Intravenous Line            Peripheral IV 01/08/18 Right;Dorsal (posterior) Arm 1 day              Physical Exam:   Gen: A&O, NAD  Cardio: RRR  Lungs: CTAB  Abd: Soft, non distended, non tender  Groin: Wound to R labia, no induration or drainage  Clean   Packing changed      Lab, Imaging and other studies:CBC: No results found for: WBC, HGB, HCT, MCV, PLT, ADJUSTEDWBC, MCH, MCHC, RDW, MPV, NRBC, CMP: No results found for: NA, K, CL, CO2, ANIONGAP, BUN, CREATININE, GLUCOSE, CALCIUM, AST, ALT, ALKPHOS, PROT, ALBUMIN, BILITOT, EGFR  VTE Pharmacologic Prophylaxis: Heparin  VTE Mechanical Prophylaxis: sequential compression device

## 2018-01-09 NOTE — PLAN OF CARE
Problem: Potential for Falls  Goal: Patient will remain free of falls  INTERVENTIONS:  - Assess patient frequently for physical needs  -  Identify cognitive and physical deficits and behaviors that affect risk of falls    -  [place] fall precautions as indicated by assessment   - Educate patient/family on patient safety including physical limitations  - Instruct patient to call for assistance with activity based on assessment  - Modify environment to reduce risk of injury  - Consider OT/PT consult to assist with strengthening/mobility   Outcome: Progressing      Problem: Prexisting or High Potential for Compromised Skin Integrity  Goal: Skin integrity is maintained or improved  INTERVENTIONS:  - Identify patients at risk for skin breakdown  - Assess and monitor skin integrity  - Assess and monitor nutrition and hydration status  - Monitor labs (i e  albumin)  - Assess for incontinence   - Turn and reposition patient  - Assist with mobility/ambulation  - Relieve pressure over bony prominences  - Avoid friction and shearing  - Provide appropriate hygiene as needed including keeping skin clean and dry  - Evaluate need for skin moisturizer/barrier cream  - Collaborate with interdisciplinary team (i e  Nutrition, Rehabilitation, etc )   - Patient/family teaching   Outcome: Progressing      Problem: SKIN/TISSUE INTEGRITY - ADULT  Goal: Skin integrity remains intact  INTERVENTIONS  - Identify patients at risk for skin breakdown  - Assess and monitor skin integrity  - Assess and monitor nutrition and hydration status  - Monitor labs (i e  albumin)  - Assess for incontinence   - Turn and reposition patient  - Assist with mobility/ambulation  - Relieve pressure over bony prominences  - Avoid friction and shearing  - Provide appropriate hygiene as needed including keeping skin clean and dry  - Evaluate need for skin moisturizer/barrier cream  - Collaborate with interdisciplinary team (i e  Nutrition, Rehabilitation, etc )   - Patient/family teaching   Outcome: Progressing    Goal: Incision(s), wounds(s) or drain site(s) healing without S/S of infection  INTERVENTIONS  - Assess and document risk factors for skin impairment   - Assess and document dressing, incision, wound bed, drain sites and surrounding tissue  - Initiate Nutrition services consult and/or wound management as needed   Outcome: Progressing    Goal: Oral mucous membranes remain intact  INTERVENTIONS  - Assess oral mucosa and hygiene practices  - Implement preventative oral hygiene regimen  - Implement oral medicated treatments as ordered  - Initiate Nutrition services referral as needed   Outcome: Progressing      Problem: MUSCULOSKELETAL - ADULT  Goal: Maintain or return mobility to safest level of function  INTERVENTIONS:  - Assess patient's ability to carry out ADLs; assess patient's baseline for ADL function and identify physical deficits which impact ability to perform ADLs (bathing, care of mouth/teeth, toileting, grooming, dressing, etc )  - Assess/evaluate cause of self-care deficits   - Assess range of motion  - Assess patient's mobility; develop plan if impaired  - Assess patient's need for assistive devices and provide as appropriate  - Encourage maximum independence but intervene and supervise when necessary  - Involve family in performance of ADLs  - Assess for home care needs following discharge   - Request OT consult to assist with ADL evaluation and planning for discharge  - Provide patient education as appropriate   Outcome: Progressing    Goal: Maintain proper alignment of affected body part  INTERVENTIONS:  - Support, maintain and protect limb and body alignment  - Provide pt/fam with appropriate education   Outcome: Progressing      Problem: PAIN - ADULT  Goal: Verbalizes/displays adequate comfort level or baseline comfort level  Interventions:  - Encourage patient to monitor pain and request assistance  - Assess pain using appropriate pain scale  - Administer analgesics based on type and severity of pain and evaluate response  - Implement non-pharmacological measures as appropriate and evaluate response  - Consider cultural and social influences on pain and pain management  - Notify physician/advanced practitioner if interventions unsuccessful or patient reports new pain   Outcome: Progressing      Problem: INFECTION - ADULT  Goal: Absence or prevention of progression during hospitalization  INTERVENTIONS:  - Assess and monitor for signs and symptoms of infection  - Monitor lab/diagnostic results  - Monitor all insertion sites, i e  indwelling lines, tubes, and drains  - Monitor endotracheal (as able) and nasal secretions for changes in amount and color  - [place] appropriate cooling/warming therapies per order  - Administer medications as ordered  - Instruct and encourage patient and family to use good hand hygiene technique  - Identify and instruct in appropriate isolation precautions for identified infection/condition   Outcome: Progressing    Goal: Absence of fever/infection during neutropenic period  INTERVENTIONS:  - Monitor WBC  - Implement neutropenic guidelines   Outcome: Progressing      Problem: SAFETY ADULT  Goal: Maintain or return to baseline ADL function  INTERVENTIONS:  -  Assess patient's ability to carry out ADLs; assess patient's baseline for ADL function and identify physical deficits which impact ability to perform ADLs (bathing, care of mouth/teeth, toileting, grooming, dressing, etc )  - Assess/evaluate cause of self-care deficits   - Assess range of motion  - Assess patient's mobility; develop plan if impaired  - Assess patient's need for assistive devices and provide as appropriate  - Encourage maximum independence but intervene and supervise when necessary  ¯ Involve family in performance of ADLs  ¯ Assess for home care needs following discharge   ¯ Request OT consult to assist with ADL evaluation and planning for discharge  ¯ Provide patient education as appropriate   Outcome: Progressing    Goal: Maintain or return mobility status to optimal level  INTERVENTIONS:  - Assess patient's baseline mobility status (ambulation, transfers, stairs, etc )    - Identify cognitive and physical deficits and behaviors that affect mobility  - Identify mobility aids required to assist with transfers and/or ambulation (gait belt, sit-to-stand, lift, walker, cane, etc )  - [place] fall precautions as indicated by assessment  - Record patient progress and toleration of activity level on Mobility SBAR; progress patient to next Phase/Stage  - Instruct patient to call for assistance with activity based on assessment  - Request Rehabilitation consult to assist with strengthening/weightbearing, etc    Outcome: Progressing      Problem: DISCHARGE PLANNING  Goal: Discharge to home or other facility with appropriate resources  INTERVENTIONS:  - Identify barriers to discharge w/patient and caregiver  - Arrange for needed discharge resources and transportation as appropriate  - Identify discharge learning needs (meds, wound care, etc )  - Arrange for interpretive services to assist at discharge as needed  - Refer to Case Management Department for coordinating discharge planning if the patient needs post-hospital services based on physician/advanced practitioner order or complex needs related to functional status, cognitive ability, or social support system   Outcome: Progressing      Problem: Knowledge Deficit  Goal: Patient/family/caregiver demonstrates understanding of disease process, treatment plan, medications, and discharge instructions  Complete learning assessment and assess knowledge base    Interventions:  - Provide teaching at level of understanding  - Provide teaching via preferred learning methods   Outcome: Progressing      Problem: DISCHARGE PLANNING - CARE MANAGEMENT  Goal: Discharge to post-acute care or home with appropriate resources  INTERVENTIONS:  - Conduct assessment to determine patient/family and health care team treatment goals, and need for post-acute services based on payer coverage, community resources, and patient preferences, and barriers to discharge  - Address psychosocial, clinical, and financial barriers to discharge as identified in assessment in conjunction with the patient/family and health care team  - Arrange appropriate level of post-acute services according to patient's   needs and preference and payer coverage in collaboration with the physician and health care team  - Communicate with and update the patient/family, physician, and health care team regarding progress on the discharge plan  - Arrange appropriate transportation to post-acute venues  - Patient to be evaluated  Outcome: Progressing      Problem: Nutrition/Hydration-ADULT  Goal: Nutrient/Hydration intake appropriate for improving, restoring or maintaining nutritional needs  Monitor and assess patient's nutrition/hydration status for malnutrition (ex- brittle hair, bruises, dry skin, pale skin and conjunctiva, muscle wasting, smooth red tongue, and disorientation)  Collaborate with interdisciplinary team and initiate plan and interventions as ordered  Monitor patient's weight and dietary intake as ordered or per policy  Utilize nutrition screening tool and intervene per policy  Determine patient's food preferences and provide high-protein, high-caloric foods as appropriate       INTERVENTIONS:  - Monitor oral intake, urinary output, labs, and treatment plans  - Assess nutrition and hydration status and recommend course of action  - Evaluate amount of meals eaten  - Assist patient with eating if necessary   - Allow adequate time for meals  - Recommend/ encourage appropriate diets, oral nutritional supplements, and vitamin/mineral supplements  - Order, calculate, and assess calorie counts as needed  - Recommend, monitor, and adjust tube feedings and TPN/PPN based on assessed needs  - Assess need for intravenous fluids  - Provide specific nutrition/hydration education as appropriate  - Include patient/family/caregiver in decisions related to nutrition   Outcome: Progressing

## 2018-01-09 NOTE — DISCHARGE SUMMARY
Discharge Summary - Tres Snow 65 y o  female MRN: [id number]    Unit/Bed#: [facility] 928-01 Encounter: [id number]    Admission Date: 1/4/2018     Admitting Diagnosis: Groin pain [R10 30]  Soft tissue infection [L08 9]    HPI: Tres Snow is a 65 y o  female who presents with  5 day history of worsening pain in her right groin  She states she fell on Friday (and fractured her left humerus) and noticed pain the next day, and believed it was due to her fall  However, the pain in her right labial area increased and she began to experience fevers, chills, nausea, and vomiting, especially today  She denies hx of DM (states she is pre-diabetic) or hx of soft tissue infections  She went to the [facility] where an I&D of the right lateral labia was performed  Purulent drainage was reported  CT was performed which shows extensive subcutaneous inflammation in the subcutaneous soft tissues of the right aspect of the vagina and mons pubis    There is also a nodular area of soft tissue inflammation in the right labia majora, suggesting a 2 4 cm phlegmon or organizing abscess  Procedures Performed:   1/4 Incision and Drainage, wound debridement of right perineal wound Lesia Carreno)    Hospital Course: The patient was transferred to [facility] [place] once the CT showed findings concerning for a necrotizing soft tissue infection  She was taken that evening (overnight) for I&D of the right labia with washout  Cultures were taken at that time and the wound was packed  She was started on Cefepime/Vancomyinc/Clindamycin empirically until cultures returned and she was thus narrowed to Ancef/Flagyl  She underwent daily packing changes w/ wound checks and she was ready for discharge on 1/9  Her cultures came back as mixed aubrie with both GNR and GPCs so she was discharged on Keflex & Flagyl for a 2 week total antibiotic course  CM was consulted for VNA services to aid her in wound care for packing   During her hospital stay we had used 1 inch plain packing  On discharge the wound had no active drainage  The area still appeared somewhat inflamed however there was no cellulitis or indurated tissue  Her pain had markedly decreased  Significant Findings, Care, Treatment and Services Provided: as above    Complications: None    Discharge Diagnosis: Soft tissue infection of right labia/perineum    Condition at Discharge: good     Discharge instructions/Information to patient and family:   See after visit summary for information provided to patient and family  Provisions for Follow-Up Care:  See after visit summary for information related to follow-up care and any pertinent home health orders  Disposition: Home    Planned Readmission: No    Discharge Statement   I spent 30 minutes discharging the patient  This time was spent on the day of discharge  I had direct contact with the patient on the day of discharge  Additional documentation is required if more than 30 minutes were spent on discharge  Discharge Medications:  See after visit summary for reconciled discharge medications provided to patient and family

## 2018-01-09 NOTE — SOCIAL WORK
Cm informed by RN that patient is medically stable for d/c today  Cm confirmed with [facility] that they will provide [address]

## 2018-01-17 ENCOUNTER — ALLSCRIPTS OFFICE VISIT (OUTPATIENT)
Dept: OTHER | Facility: OTHER | Age: 63
End: 2018-01-17

## 2018-01-18 NOTE — PROGRESS NOTES
Assessment   1  Labial abscess (616 4) (N76 4)    Plan   Labial abscess, PMH: Wrist Surgery    · Follow-up visit in 1 month Evaluation and Treatment  Follow-up  Status: Hold For -    Scheduling  Requested for: [id number]   Ordered; For: Labial abscess, PMH: Wrist Surgery; Ordered By: Bruna Rinne Performed:  Due: [id number]  Unlinked    · Diovan 80 MG Oral Tablet (Valsartan)   Dispense: 90 Days ; #:90 00 TABS; Refill: 0; FARHEEN = Y; Sent To: ; Last Updated By: Savannah Christina; 1/17/2018 1:55:22 PM   · Meloxicam 15 MG Oral Tablet   Dispense: 30 Days ; #:30 00 TABS; Refill: 0; FARHEEN = Y; Sent To: ; Last Updated By: Savannah Christina; 1/17/2018 1:55:22 PM   · Omeprazole 20 MG Oral Capsule Delayed Release   Dispense: 90 Days ; #:90 00 CPDR; Refill: 0; FARHEEN = Y; Sent To: ; Last Updated By: Savannah Christina; 1/17/2018 1:55:22 PM   · PriLOSEC 20 MG CPDR (Omeprazole)   Rx By: Sima Thompson; Dispense: 30 Days ; #:30 00 CPDR; Refill: 6; FARHEEN = N; Sent To: [facility]; Last Updated By: Savannah Christina; 1/17/2018 1:55:22 PM   · Promethazine-DM 6 25-15 MG/5ML Oral Syrup   Dispense: 12 Days ; #:120 00 SYRP; Refill: 0; FARHEEN = Y; Sent To: ; Last Updated By: Savannah Christina; 1/17/2018 1:55:22 PM    Discussion/Summary   Discussion Summary:    52-year-old white female had a labial abscess drained  Packing is currently on going  The wound looks good and clean  Continue packing until it is no longer pack a bbl  See back 1 month  Goals and Barriers: The patient has the current Goals: Resolve wound  The patent has the current Barriers: Open wound  Patient's Capacity to Self-Care: Patient is able to Self-Care  Patient Education: Educational resources provided: Verbal instructions given  Medication SE Review and Pt Understands Tx: Possible side effects of new medications were reviewed with the patient/guardian today  The treatment plan was reviewed with the patient/guardian   The patient/guardian understands and agrees with the treatment plan    Self Referrals:    Self Referrals: No Seen acutely in ED  Chief Complaint   Chief Complaint Free Text Note Form: f/u infection in groin  History of Present Illness   HPI: 58-year-old female status post I&D right labial abscess  Has been undergoing dressing changes at home by visiting nurses  No new complaints or problems  Wound continues to improved per her report  Active Problems   1  Arthritis (716 90) (M19 90)   2  Diverticulitis of colon (562 11) (K57 32)   3  Hiatal hernia (553 3) (K44 9)   4  Hypertension (401 9) (I10)   5  Morbid or severe obesity due to excess calories (278 01) (E66 01)   6  Sleep apnea (780 57) (G47 30)   7  Stress Incontinence (788 39)   8  Varicose Veins Of Lower Extremities (454 9)    Past Medical History   1  History of Skin lesion (709 9) (L98 9)    Surgical History   1  History of Cholecystectomy Laparoscopic   2  History of Reported Hx Of Hip Replacement - Right Side   3  History of Small Bowel Resection   4  History of Thyroid Surgery Total Thyroidectomy   5  History of Wrist Surgery    Family History   Mother    1  Family history of malignant neoplasm of ovary (V16 41) (Z80 41)  Father    2  Family history of malignant neoplasm of prostate (S64 63) (Z80 45)    Social History    · Always uses seat belt   · Full-time employment   · Never a smoker   · No alcohol use   ·  (V61 07) (Z63 4)    Current Meds    1  Astepro 0 15 % Nasal Solution; Therapy: 38ZCN4781 to (Last NS:60XJP1422)  Requested for: 37IUY3363 Ordered   2  Avelox 400 MG Oral Tablet; Therapy: 21Jan2012 to (Last EJ:63OGR7381)  Requested for: 21Jan2012 Ordered   3  Diovan 80 MG Oral Tablet; Therapy: 47EPK4449 to (Last Rx:80Zre7503)  Requested for: 70Lyg9349 Ordered   4  Lidoderm 5 % External Patch; Therapy: 04CNZ9289 to (Last Rx:78Vot4812)  Requested for: 12Hve6363 Ordered   5  Meloxicam 15 MG Oral Tablet;      Therapy: 56LNM6066 to (Last Rx: 37GTD1688)  Requested for: 37IBQ4452 Ordered   6  Omeprazole 20 MG Oral Capsule Delayed Release; Therapy: 07SRJ5107 to (Last Rx:02Nov2011)  Requested for: 83HNP1021 Ordered   7  PriLOSEC 20 MG CPDR; TAKE 1 CAPSULE DAILY BEFORE EATING; Therapy: 12NPW0226 to (Last Rx:26Jan2012)  Requested for: 82UCS0448 Ordered   8  Promethazine-DM 6 25-15 MG/5ML Oral Syrup; Therapy: 21Jan2012 to (Last ZK:33VAD0830)  Requested for: 21Jan2012 Ordered    Allergies   1  Morphine Derivatives   2  Penicillins    Vitals   Vital Signs    Recorded: 79JFI1523 42:33FA   Systolic 616   Diastolic 78   Height 5 ft 8 in   Weight 288 lb    BMI Calculated 43 79   BSA Calculated 2 39     Physical Exam        Skin      Skin and subcutaneous tissue: Abnormal  -- Right lateral labial region is an open wound about 2 x 1 x 0 5 with a tunnel at 6 o'clock about 2 cm  The base the wound is clean and granular  Rebecca wound is clean, no erythema        Signatures    Electronically signed by : Parke Fothergill, MD; Jan 17 2018  2:20PM EST                       (Author)

## 2018-01-22 VITALS
SYSTOLIC BLOOD PRESSURE: 114 MMHG | BODY MASS INDEX: 43.65 KG/M2 | WEIGHT: 288 LBS | HEIGHT: 68 IN | DIASTOLIC BLOOD PRESSURE: 78 MMHG

## 2018-02-05 LAB — FUNGUS SPEC CULT: NORMAL

## 2018-02-14 ENCOUNTER — OFFICE VISIT (OUTPATIENT)
Dept: SURGERY | Facility: CLINIC | Age: 63
End: 2018-02-14

## 2018-02-14 VITALS
SYSTOLIC BLOOD PRESSURE: 118 MMHG | DIASTOLIC BLOOD PRESSURE: 80 MMHG | HEIGHT: 68 IN | BODY MASS INDEX: 43.65 KG/M2 | WEIGHT: 288 LBS | TEMPERATURE: 98.9 F

## 2018-02-14 DIAGNOSIS — Z09 POSTOP CHECK: Primary | ICD-10-CM

## 2018-02-14 PROCEDURE — 99024 POSTOP FOLLOW-UP VISIT: CPT | Performed by: SURGERY

## 2018-02-14 NOTE — LETTER
February 14, 2018     Marcin Renteria DO  [address]  [facility] [address]    Patient: Yadira Tompkins   YOB: 1955   Date of Visit: 2/14/2018       Dear Dr Alfonso Thomas: Thank you for referring Mickey Tillman to me for evaluation  Below are my notes for this consultation  If you have questions, please do not hesitate to call me  I look forward to following your patient along with you           Sincerely,        Handy Grissom MD        CC: No Recipients

## 2018-02-14 NOTE — PROGRESS NOTES
Office Visit - General Surgery  Emily Atkins MRN: [de-identified]  Encounter: [id number]    Assessment and Plan    Problem List Items Addressed This Visit     None          Chief Complaint:  Emily Atkins is a 65 y o  female who presents for Post-op (recheck wound right groin area)    Subjective  This is a 32-year-old female status post signed the right groin/labial abscess  Feeling better  Still notices a little bit of drainage here and there    Past Medical History  Past Medical History:   Diagnosis Date    Cancer ([address] )     thyroid    Hiatal hernia     Prediabetes        Past Surgical History  Past Surgical History:   Procedure Laterality Date    CHOLECYSTECTOMY      MECKEL DIVERTICULUM EXCISION      WOUND DEBRIDEMENT Right 1/4/2018    Procedure: incision and drainage DEBRIDEMENT right labia; Surgeon: Kanika Maciel MD;  Location: [facility];  Service: General       Family History  No family history on file  Medications  Current Outpatient Prescriptions on File Prior to Visit   Medication Sig Dispense Refill    levothyroxine 125 mcg tablet Take 125 mcg by mouth daily In am       No current facility-administered medications on file prior to visit  Allergies  Allergies   Allergen Reactions    Morphine     Penicillins        Review of Systems    Objective  Vitals:    02/14/18 1020   BP: 118/80   Temp: 98 9 °F (37 2 °C)       Physical Exam   Right groin:  Lateral aspect of the labia is a healing incision  There is a slightly open area about 0 2 x 1 5  by 0 1cm with minimal slough    Rebecca wound is clean and intact

## 2018-02-14 NOTE — ASSESSMENT & PLAN NOTE
Who doing well  Continue dressings as needed  Reassured that wound will gradually close as time goes on

## 2018-02-20 LAB
MYCOBACTERIUM SPEC CULT: NORMAL
RHODAMINE-AURAMINE STN SPEC: NORMAL

## 2019-05-29 NOTE — ANESTHESIA PREPROCEDURE EVALUATION
Review of Systems/Medical History  Patient summary reviewed  Chart reviewed      Cardiovascular   Pulmonary       GI/Hepatic     Hiatal hernia,             Endo/Other  History of thyroid disease , hypothyroidism,      GYN       Hematology   Musculoskeletal  Obesity  morbid obesity,        Neurology    Neuromuscular disease ,    Psychology           Physical Exam    Airway    Mallampati score: II  TM Distance: >3 FB  Neck ROM: full     Dental   No notable dental hx     Cardiovascular  Rhythm: regular, Rate: normal, Cardiovascular exam normal    Pulmonary  Pulmonary exam normal Breath sounds clear to auscultation,     Other Findings        Anesthesia Plan  ASA Score- 3 Emergent    Anesthesia Type- general with ASA Monitors  Additional Monitors:   Airway Plan: ETT  Plan Factors-    Induction- intravenous  Postoperative Plan- Plan for postoperative opioid use  Informed Consent- Anesthetic plan and risks discussed with patient 
N/A

## 2020-09-12 ENCOUNTER — HOSPITAL ENCOUNTER (EMERGENCY)
Dept: HOSPITAL 53 - M ED | Age: 65
Discharge: HOME | End: 2020-09-12
Payer: COMMERCIAL

## 2020-09-12 VITALS — SYSTOLIC BLOOD PRESSURE: 170 MMHG | DIASTOLIC BLOOD PRESSURE: 84 MMHG

## 2020-09-12 VITALS — WEIGHT: 290.61 LBS | HEIGHT: 68 IN | BODY MASS INDEX: 44.04 KG/M2

## 2020-09-12 DIAGNOSIS — Z79.899: ICD-10-CM
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
## 2020-09-12 DIAGNOSIS — E07.9: ICD-10-CM
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
## 2020-09-12 DIAGNOSIS — M41.84: ICD-10-CM

## 2020-09-12 DIAGNOSIS — Z88.5: ICD-10-CM

## 2020-09-12 DIAGNOSIS — Z88.0: ICD-10-CM

## 2020-09-12 DIAGNOSIS — S23.3XXA: Primary | ICD-10-CM
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
## 2024-04-01 PROCEDURE — 88304 TISSUE EXAM BY PATHOLOGIST: CPT | Performed by: PATHOLOGY

## 2024-04-03 ENCOUNTER — LAB REQUISITION (OUTPATIENT)
Dept: LAB | Facility: HOSPITAL | Age: 69
End: 2024-04-03
Payer: COMMERCIAL

## 2024-04-03 DIAGNOSIS — D48.5 NEOPLASM OF UNCERTAIN BEHAVIOR OF SKIN: ICD-10-CM

## 2024-04-05 PROCEDURE — 88304 TISSUE EXAM BY PATHOLOGIST: CPT | Performed by: PATHOLOGY

## 2025-03-21 ENCOUNTER — HOSPITAL ENCOUNTER (EMERGENCY)
Facility: HOSPITAL | Age: 70
Discharge: HOME | End: 2025-03-21
Attending: EMERGENCY MEDICINE
Payer: COMMERCIAL

## 2025-03-21 ENCOUNTER — APPOINTMENT (EMERGENCY)
Dept: CT IMAGING | Facility: HOSPITAL | Age: 70
End: 2025-03-21
Payer: COMMERCIAL

## 2025-03-21 VITALS
WEIGHT: 280.65 LBS | BODY MASS INDEX: 44.05 KG/M2 | RESPIRATION RATE: 18 BRPM | DIASTOLIC BLOOD PRESSURE: 66 MMHG | HEIGHT: 67 IN | OXYGEN SATURATION: 96 % | TEMPERATURE: 97.6 F | SYSTOLIC BLOOD PRESSURE: 136 MMHG | HEART RATE: 66 BPM

## 2025-03-21 DIAGNOSIS — U07.1 COVID: Primary | ICD-10-CM

## 2025-03-21 DIAGNOSIS — R11.2 NAUSEA AND VOMITING: ICD-10-CM

## 2025-03-21 DIAGNOSIS — R93.89 ABNORMAL CT SCAN: ICD-10-CM

## 2025-03-21 LAB
ALBUMIN SERPL BCG-MCNC: 4 G/DL (ref 3.5–5)
ALP SERPL-CCNC: 53 U/L (ref 34–104)
ALT SERPL W P-5'-P-CCNC: 20 U/L (ref 7–52)
ANION GAP SERPL CALCULATED.3IONS-SCNC: 9 MMOL/L (ref 4–13)
AST SERPL W P-5'-P-CCNC: 22 U/L (ref 13–39)
BACTERIA UR QL AUTO: ABNORMAL /HPF
BASOPHILS # BLD AUTO: 0.03 THOUSANDS/ÂΜL (ref 0–0.1)
BASOPHILS NFR BLD AUTO: 0 % (ref 0–1)
BILIRUB SERPL-MCNC: 0.68 MG/DL (ref 0.2–1)
BILIRUB UR QL STRIP: NEGATIVE
BUN SERPL-MCNC: 18 MG/DL (ref 5–25)
CALCIUM SERPL-MCNC: 9 MG/DL (ref 8.4–10.2)
CHLORIDE SERPL-SCNC: 102 MMOL/L (ref 96–108)
CLARITY UR: CLEAR
CO2 SERPL-SCNC: 25 MMOL/L (ref 21–32)
COLOR UR: YELLOW
CREAT SERPL-MCNC: 0.72 MG/DL (ref 0.6–1.3)
EOSINOPHIL # BLD AUTO: 0.03 THOUSAND/ÂΜL (ref 0–0.61)
EOSINOPHIL NFR BLD AUTO: 0 % (ref 0–6)
ERYTHROCYTE [DISTWIDTH] IN BLOOD BY AUTOMATED COUNT: 13 % (ref 11.6–15.1)
FLUAV AG UPPER RESP QL IA.RAPID: NEGATIVE
FLUBV AG UPPER RESP QL IA.RAPID: NEGATIVE
GFR SERPL CREATININE-BSD FRML MDRD: 87 ML/MIN/1.73SQ M
GLUCOSE SERPL-MCNC: 179 MG/DL (ref 65–140)
GLUCOSE UR STRIP-MCNC: NEGATIVE MG/DL
HCT VFR BLD AUTO: 44.8 % (ref 34.8–46.1)
HGB BLD-MCNC: 15 G/DL (ref 11.5–15.4)
HGB UR QL STRIP.AUTO: NEGATIVE
IMM GRANULOCYTES # BLD AUTO: 0.08 THOUSAND/UL (ref 0–0.2)
IMM GRANULOCYTES NFR BLD AUTO: 1 % (ref 0–2)
KETONES UR STRIP-MCNC: ABNORMAL MG/DL
LACTATE SERPL-SCNC: 1.6 MMOL/L (ref 0.5–2)
LEUKOCYTE ESTERASE UR QL STRIP: NEGATIVE
LIPASE SERPL-CCNC: 8 U/L (ref 11–82)
LYMPHOCYTES # BLD AUTO: 1.76 THOUSANDS/ÂΜL (ref 0.6–4.47)
LYMPHOCYTES NFR BLD AUTO: 21 % (ref 14–44)
MCH RBC QN AUTO: 30.1 PG (ref 26.8–34.3)
MCHC RBC AUTO-ENTMCNC: 33.5 G/DL (ref 31.4–37.4)
MCV RBC AUTO: 90 FL (ref 82–98)
MONOCYTES # BLD AUTO: 0.4 THOUSAND/ÂΜL (ref 0.17–1.22)
MONOCYTES NFR BLD AUTO: 5 % (ref 4–12)
MUCOUS THREADS UR QL AUTO: ABNORMAL
NEUTROPHILS # BLD AUTO: 6.26 THOUSANDS/ÂΜL (ref 1.85–7.62)
NEUTS SEG NFR BLD AUTO: 73 % (ref 43–75)
NITRITE UR QL STRIP: NEGATIVE
NON-SQ EPI CELLS URNS QL MICRO: ABNORMAL /HPF
NRBC BLD AUTO-RTO: 0 /100 WBCS
PH UR STRIP.AUTO: 6.5 [PH]
PLATELET # BLD AUTO: 213 THOUSANDS/UL (ref 149–390)
PMV BLD AUTO: 9 FL (ref 8.9–12.7)
POTASSIUM SERPL-SCNC: 3.7 MMOL/L (ref 3.5–5.3)
PROT SERPL-MCNC: 7 G/DL (ref 6.4–8.4)
PROT UR STRIP-MCNC: ABNORMAL MG/DL
RBC # BLD AUTO: 4.98 MILLION/UL (ref 3.81–5.12)
RBC #/AREA URNS AUTO: ABNORMAL /HPF
SARS-COV+SARS-COV-2 AG RESP QL IA.RAPID: POSITIVE
SODIUM SERPL-SCNC: 136 MMOL/L (ref 135–147)
SP GR UR STRIP.AUTO: >=1.05 (ref 1–1.03)
UROBILINOGEN UR STRIP-ACNC: <2 MG/DL
WBC # BLD AUTO: 8.56 THOUSAND/UL (ref 4.31–10.16)
WBC #/AREA URNS AUTO: ABNORMAL /HPF

## 2025-03-21 PROCEDURE — 96374 THER/PROPH/DIAG INJ IV PUSH: CPT

## 2025-03-21 PROCEDURE — 80053 COMPREHEN METABOLIC PANEL: CPT

## 2025-03-21 PROCEDURE — 74177 CT ABD & PELVIS W/CONTRAST: CPT

## 2025-03-21 PROCEDURE — 96375 TX/PRO/DX INJ NEW DRUG ADDON: CPT

## 2025-03-21 PROCEDURE — 36415 COLL VENOUS BLD VENIPUNCTURE: CPT

## 2025-03-21 PROCEDURE — 99284 EMERGENCY DEPT VISIT MOD MDM: CPT

## 2025-03-21 PROCEDURE — 83690 ASSAY OF LIPASE: CPT

## 2025-03-21 PROCEDURE — 71260 CT THORAX DX C+: CPT

## 2025-03-21 PROCEDURE — 96361 HYDRATE IV INFUSION ADD-ON: CPT

## 2025-03-21 PROCEDURE — 87811 SARS-COV-2 COVID19 W/OPTIC: CPT

## 2025-03-21 PROCEDURE — 81001 URINALYSIS AUTO W/SCOPE: CPT

## 2025-03-21 PROCEDURE — 99285 EMERGENCY DEPT VISIT HI MDM: CPT

## 2025-03-21 PROCEDURE — 85025 COMPLETE CBC W/AUTO DIFF WBC: CPT

## 2025-03-21 PROCEDURE — 87804 INFLUENZA ASSAY W/OPTIC: CPT

## 2025-03-21 PROCEDURE — 83605 ASSAY OF LACTIC ACID: CPT

## 2025-03-21 RX ORDER — DIPHENHYDRAMINE HYDROCHLORIDE 50 MG/ML
25 INJECTION, SOLUTION INTRAMUSCULAR; INTRAVENOUS ONCE
Status: COMPLETED | OUTPATIENT
Start: 2025-03-21 | End: 2025-03-21

## 2025-03-21 RX ORDER — METOCLOPRAMIDE HYDROCHLORIDE 5 MG/ML
10 INJECTION INTRAMUSCULAR; INTRAVENOUS ONCE
Status: COMPLETED | OUTPATIENT
Start: 2025-03-21 | End: 2025-03-21

## 2025-03-21 RX ORDER — ONDANSETRON 4 MG/1
4 TABLET, ORALLY DISINTEGRATING ORAL EVERY 6 HOURS PRN
Qty: 20 TABLET | Refills: 0 | Status: SHIPPED | OUTPATIENT
Start: 2025-03-21

## 2025-03-21 RX ORDER — ONDANSETRON 2 MG/ML
4 INJECTION INTRAMUSCULAR; INTRAVENOUS ONCE
Status: CANCELLED | OUTPATIENT
Start: 2025-03-21 | End: 2025-03-21

## 2025-03-21 RX ADMIN — IOHEXOL 100 ML: 350 INJECTION, SOLUTION INTRAVENOUS at 08:12

## 2025-03-21 RX ADMIN — DIPHENHYDRAMINE HYDROCHLORIDE 25 MG: 50 INJECTION, SOLUTION INTRAMUSCULAR; INTRAVENOUS at 07:05

## 2025-03-21 RX ADMIN — SODIUM CHLORIDE 1000 ML: 0.9 INJECTION, SOLUTION INTRAVENOUS at 07:05

## 2025-03-21 RX ADMIN — METOCLOPRAMIDE HYDROCHLORIDE 10 MG: 5 INJECTION INTRAMUSCULAR; INTRAVENOUS at 07:05

## 2025-03-21 NOTE — ED PROVIDER NOTES
Time reflects when diagnosis was documented in both MDM as applicable and the Disposition within this note       Time User Action Codes Description Comment    3/21/2025  9:11 AM Twyla Rodriguez Add [U07.1] COVID     3/21/2025  9:11 AM Twyla Rodriguez Add [R11.2] Nausea and vomiting     3/21/2025  9:11 AM Twyla Rodriguez Add [R93.89] Abnormal CT scan           ED Disposition       ED Disposition   Discharge    Condition   Stable    Date/Time   Fri Mar 21, 2025  9:11 AM    Comment   Joy Anne Labadie discharge to home/self care.                   Assessment & Plan       Medical Decision Making  70 y/o F who comes to ER for nausea and vomiting. Patient has been having symptoms for the last two days. Abdominal exam is without peritoneal signs.  No evidence of acute abdomen at this time.   Given workup, low suspicion for acute biliary disease, acute pancreatitis, acute infectious process to include but not limited to pneumonia, pyelonephritis, appendicitis.   CBC shows no leukocytosis, anemia.  CMP negative for metabolic derangements.  Lipase negative for pancreatitis. CT scan: No acute abdominal abnormalities. Incidental findings discussed.  Patient positive for Covid. Recommended supportive treatment. Advised to follow-up with gastroenterology.  Return to the ER symptoms worsens or questions or concerns arise at home.      Amount and/or Complexity of Data Reviewed  Labs: ordered. Decision-making details documented in ED Course.  Radiology: ordered. Decision-making details documented in ED Course.    Risk  Prescription drug management.        ED Course as of 03/21/25 1143   Fri Mar 21, 2025   0747 FLU/COVID Rapid Antigen (30 min. TAT) - Preferred screening test in ED(!)  Positive for covid   0753 Discussed risk and benefits of CAT scan although likely symptoms are secondary from COVID viral illness.  Patient would like to proceed with CAT scan.  Patient verbalized understanding of risk of radiation.   0909 CT chest abdomen pelvis  w contrast  IMPRESSION:     1.  No identifiable acute abnormality to account for the patient's clinical presentation.  2.  Multiple pancreatic cystic lesions, measuring up to 2.1 cm in diameter. 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 Further evaluation by gadolinium enhanced MRI of the abdomen is recommended.     3.  Please refer to the report body for description of other incidental, chronic and/or benign findings.         Medications   sodium chloride 0.9 % bolus 1,000 mL (0 mL Intravenous Stopped 3/21/25 0901)   metoclopramide (REGLAN) injection 10 mg (10 mg Intravenous Given 3/21/25 0705)   diphenhydrAMINE (BENADRYL) injection 25 mg (25 mg Intravenous Given 3/21/25 0705)   iohexol (OMNIPAQUE) 350 MG/ML injection (MULTI-DOSE) 100 mL (100 mL Intravenous Given 3/21/25 0812)       ED Risk Strat Scores                                                History of Present Illness       Chief Complaint   Patient presents with    Abdominal Pain     Pt coming in with nausea, vomiting and abd pain, last BM yesterday       Past Medical History:   Diagnosis Date    Thyroid cancer (HCC)       Past Surgical History:   Procedure Laterality Date    ABLATION COLPOCLESIS      CHOLECYSTECTOMY        History reviewed. No pertinent family history.   Social History     Tobacco Use    Smoking status: Never    Smokeless tobacco: Never   Vaping Use    Vaping status: Never Used   Substance Use Topics    Alcohol use: Not Currently    Drug use: Never      E-Cigarette/Vaping    E-Cigarette Use Never User       E-Cigarette/Vaping Substances      I have reviewed and agree with the history as documented.     66 y/o female patient presents to the ER for evaluation of abdominal pain.  Patient stated that she has been having 2 days of right upper quadrant pain in her abdomen.  Patient stated that she has been having multiple episodes of nonbilious, nonbloody emesis.  She reports that she is consistently  nauseous over the last 2 days.  She reports that the pain does not radiate into her back.  She reports that the pain is severe and constant.  She describes as sharp and stabbing.  Patient stated that she has not been able to tolerate p.o. intake due to nausea.      No noted flank pain, hematuria or UTI symptoms  No noted vaginal itching, burning or discharge  No noted recent travel outside the country, suspicious food intake or sick contacts  No noted chest pain, shortness of breath, dizziness or syncope   History of cholecystectomy        Review of Systems   Constitutional:  Negative for chills and fever.   HENT:  Negative for ear pain and sore throat.    Eyes:  Negative for pain and visual disturbance.   Respiratory:  Negative for cough and shortness of breath.    Cardiovascular:  Negative for chest pain and palpitations.   Gastrointestinal:  Positive for abdominal pain, nausea and vomiting.   Genitourinary:  Negative for dysuria and hematuria.   Musculoskeletal:  Negative for arthralgias and back pain.   Skin:  Negative for color change and rash.   Neurological:  Negative for seizures and syncope.   All other systems reviewed and are negative.          Objective       ED Triage Vitals   Temperature Pulse Blood Pressure Respirations SpO2 Patient Position - Orthostatic VS   03/21/25 0638 03/21/25 0630 03/21/25 0636 03/21/25 0630 03/21/25 0630 03/21/25 0630   97.6 °F (36.4 °C) 64 (!) 180/88 17 96 % Sitting      Temp Source Heart Rate Source BP Location FiO2 (%) Pain Score    03/21/25 0638 03/21/25 0630 03/21/25 0630 -- --    Oral Monitor Right arm        Vitals      Date and Time Temp Pulse SpO2 Resp BP Pain Score FACES Pain Rating User   03/21/25 1100 -- 70 97 % 18 129/61 -- -- DS   03/21/25 1030 -- 67 96 % 18 138/72 -- -- DS   03/21/25 1000 -- 67 96 % 18 146/73 -- -- SB   03/21/25 0830 -- 66 96 % 18 166/98 -- -- DS   03/21/25 0638 97.6 °F (36.4 °C) -- -- -- -- -- -- KL   03/21/25 0636 -- -- -- -- 180/88 -- --     03/21/25 0630 -- 64 96 % 17 -- -- -- KL            Physical Exam  Vitals and nursing note reviewed.   Constitutional:       General: She is not in acute distress.     Appearance: She is well-developed.   HENT:      Head: Normocephalic and atraumatic.   Eyes:      Conjunctiva/sclera: Conjunctivae normal.   Cardiovascular:      Rate and Rhythm: Normal rate and regular rhythm.      Heart sounds: Normal heart sounds.   Pulmonary:      Effort: Pulmonary effort is normal. No respiratory distress.      Breath sounds: Normal breath sounds.   Abdominal:      General: Abdomen is protuberant. A surgical scar is present.      Palpations: Abdomen is soft.      Tenderness: There is abdominal tenderness in the right upper quadrant.   Musculoskeletal:         General: No swelling.      Cervical back: Neck supple.   Skin:     General: Skin is warm and dry.      Capillary Refill: Capillary refill takes less than 2 seconds.   Neurological:      Mental Status: She is alert and oriented to person, place, and time.   Psychiatric:         Mood and Affect: Mood normal.         Behavior: Behavior normal.         Results Reviewed       Procedure Component Value Units Date/Time    Urine Microscopic [[id number]]  (Abnormal) Collected: 03/21/25 0943    Lab Status: Final result Specimen: Urine, Clean Catch Updated: 03/21/25 1011     RBC, UA 4-10 /hpf      WBC, UA 2-4 /hpf      Epithelial Cells Occasional /hpf      Bacteria, UA None Seen /hpf      MUCUS THREADS Moderate    UA w Reflex to Microscopic w Reflex to Culture [[id number]]  (Abnormal) Collected: 03/21/25 0943    Lab Status: Final result Specimen: Urine, Clean Catch Updated: 03/21/25 1003     Color, UA Yellow     Clarity, UA Clear     Specific Gravity, UA >=1.050     pH, UA 6.5     Leukocytes, UA Negative     Nitrite, UA Negative     Protein, UA 50 (1+) mg/dl      Glucose, UA Negative mg/dl      Ketones, UA Trace mg/dl      Urobilinogen, UA <2.0 mg/dl      Bilirubin, UA Negative      Occult Blood, UA Negative    Comprehensive metabolic panel [[id number]]  (Abnormal) Collected: 03/21/25 0716    Lab Status: Final result Specimen: Blood from Arm, Left Updated: 03/21/25 0743     Sodium 136 mmol/L      Potassium 3.7 mmol/L      Chloride 102 mmol/L      CO2 25 mmol/L      ANION GAP 9 mmol/L      BUN 18 mg/dL      Creatinine 0.72 mg/dL      Glucose 179 mg/dL      Calcium 9.0 mg/dL      AST 22 U/L      ALT 20 U/L      Alkaline Phosphatase 53 U/L      Total Protein 7.0 g/dL      Albumin 4.0 g/dL      Total Bilirubin 0.68 mg/dL      eGFR 87 ml/min/1.73sq m     Narrative:      [employer] guidelines for Chronic Kidney Disease (CKD):     Stage 1 with normal or high GFR (GFR > 90 mL/min/1.73 square meters)    Stage 2 Mild CKD (GFR = 60-89 mL/min/1.73 square meters)    Stage 3A Moderate CKD (GFR = 45-59 mL/min/1.73 square meters)    Stage 3B Moderate CKD (GFR = 30-44 mL/min/1.73 square meters)    Stage 4 Severe CKD (GFR = 15-29 mL/min/1.73 square meters)    Stage 5 End Stage CKD (GFR <15 mL/min/1.73 square meters)  Note: GFR calculation is accurate only with a steady state creatinine    Lipase [[id number]]  (Abnormal) Collected: 03/21/25 0716    Lab Status: Final result Specimen: Blood from Arm, Left Updated: 03/21/25 0743     Lipase 8 u/L     Lactic acid, plasma (w/reflex if result > 2.0) [[id number]]  (Normal) Collected: 03/21/25 0716    Lab Status: Final result Specimen: Blood from Arm, Left Updated: 03/21/25 0743     LACTIC ACID 1.6 mmol/L     Narrative:      Result may be elevated if tourniquet was used during collection.    FLU/COVID Rapid Antigen (30 min. TAT) - Preferred screening test in ED [[id number]]  (Abnormal) Collected: 03/21/25 0716    Lab Status: Final result Specimen: Nares from Nose Updated: 03/21/25 0742     SARS COV Rapid Antigen Positive     Influenza A Rapid Antigen Negative     Influenza B Rapid Antigen Negative    Narrative:      This test has been performed using  the [employer] 2 FLU+SARS Antigen test under the Emergency Use Authorization (EUA). This test has been validated by the  and verified by the performing laboratory. The Fab[employer]ola uses lateral flow immunofluorescent sandwich assay to detect SARS-COV, Influenza A and Influenza B Antigen.     The [employer] 2 SARS Antigen test does not differentiate between SARS-CoV and SARS-CoV-2.     Negative results are presumptive and may be confirmed with a molecular assay, if necessary, for patient management. Negative results do not rule out SARS-CoV-2 or influenza infection and should not be used as the sole basis for treatment or patient management decisions. A negative test result may occur if the level of antigen in a sample is below the limit of detection of this test.     Positive results are indicative of the presence of viral antigens, but do not rule out bacterial infection or co-infection with other viruses.     All test results should be used as an adjunct to clinical observations and other information available to the provider.    FOR PEDIATRIC PATIENTS - copy/paste COVID Guidelines URL to browser: [email or web address]    CBC and differential [[id number]] Collected: 03/21/25 0716    Lab Status: Final result Specimen: Blood from Arm, Left Updated: 03/21/25 0725     WBC 8.56 Thousand/uL      RBC 4.98 Million/uL      Hemoglobin 15.0 g/dL      Hematocrit 44.8 %      MCV 90 fL      MCH 30.1 pg      MCHC 33.5 g/dL      RDW 13.0 %      MPV 9.0 fL      Platelets 213 Thousands/uL      nRBC 0 /100 WBCs      Segmented % 73 %      Immature Grans % 1 %      Lymphocytes % 21 %      Monocytes % 5 %      Eosinophils Relative 0 %      Basophils Relative 0 %      Absolute Neutrophils 6.26 Thousands/µL      Absolute Immature Grans 0.08 Thousand/uL      Absolute Lymphocytes 1.76 Thousands/µL      Absolute Monocytes 0.40 Thousand/µL      Eosinophils Absolute 0.03 Thousand/µL       Basophils Absolute 0.03 Thousands/µL             CT chest abdomen pelvis w contrast   Final Interpretation by Kareem Bains MD (03/21 0904)      1.  No identifiable acute abnormality to account for the patient's clinical presentation.   2.  Multiple pancreatic cystic lesions, measuring up to 2.1 cm in diameter. 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 Further evaluation by gadolinium enhanced MRI of the abdomen is recommended.      3.  Please refer to the report body for description of other incidental, chronic and/or benign findings.               Workstation performed: [id number]             Procedures    ED Medication and Procedure Management   None     Patient's Medications   Discharge Prescriptions    ONDANSETRON (ZOFRAN-ODT) 4 MG DISINTEGRATING TABLET    Take 1 tablet (4 mg total) by mouth every 6 (six) hours as needed for nausea or vomiting       Start Date: 3/21/2025 End Date: --       Order Dose: 4 mg       Quantity: 20 tablet    Refills: 0     No discharge procedures on file.  ED SEPSIS DOCUMENTATION   Time reflects when diagnosis was documented in both MDM as applicable and the Disposition within this note       Time User Action Codes Description Comment    3/21/2025  9:11 AM Twyla Rodriguez [U07.1] COVID     3/21/2025  9:11 AM Twyla Rodriguez [R11.2] Nausea and vomiting     3/21/2025  9:11 AM Twyla Rodriguez [R93.89] Abnormal CT scan                  CECE Mcnally  03/21/25 1144

## 2025-07-10 ENCOUNTER — VBI (OUTPATIENT)
Dept: ADMINISTRATIVE | Facility: OTHER | Age: 70
End: 2025-07-10

## 2025-07-10 NOTE — TELEPHONE ENCOUNTER
07/10/25 1:15 PM     Chart reviewed for CRC: Colonoscopy ; nothing is submitted to the patient's insurance at this time.     Maria A Overton [facility]

## (undated) DEVICE — STRL PENROSE DRAIN 18" X 3/4": Brand: CARDINAL HEALTH

## (undated) DEVICE — INTENDED FOR TISSUE SEPARATION, AND OTHER PROCEDURES THAT REQUIRE A SHARP SURGICAL BLADE TO PUNCTURE OR CUT.: Brand: BARD-PARKER SAFETY BLADES SIZE 15, STERILE

## (undated) DEVICE — PLUMEPEN PRO 10FT

## (undated) DEVICE — PENROSE DRAIN, 18 X 3 8: Brand: CARDINAL HEALTH

## (undated) DEVICE — GLOVE INDICATOR PI UNDERGLOVE SZ 8 BLUE

## (undated) DEVICE — 3000CC GUARDIAN II: Brand: GUARDIAN

## (undated) DEVICE — INVIEW CLEAR LEGGINGS: Brand: CONVERTORS

## (undated) DEVICE — IODOFORM PACKING STRIP: Brand: CURITY

## (undated) DEVICE — GLOVE SRG BIOGEL ORTHOPEDIC 7.5

## (undated) DEVICE — REM POLYHESIVE ADULT PATIENT RETURN ELECTRODE: Brand: VALLEYLAB

## (undated) DEVICE — GAUZE SPONGES,16 PLY: Brand: CURITY

## (undated) DEVICE — STRL UNIVERSAL MINOR GENERAL: Brand: CARDINAL HEALTH